# Patient Record
Sex: MALE | Employment: FULL TIME | URBAN - METROPOLITAN AREA
[De-identification: names, ages, dates, MRNs, and addresses within clinical notes are randomized per-mention and may not be internally consistent; named-entity substitution may affect disease eponyms.]

---

## 2017-04-26 ENCOUNTER — OFFICE VISIT (OUTPATIENT)
Dept: SLEEP MEDICINE | Age: 67
End: 2017-04-26

## 2017-04-26 VITALS
HEART RATE: 88 BPM | SYSTOLIC BLOOD PRESSURE: 150 MMHG | BODY MASS INDEX: 38.47 KG/M2 | TEMPERATURE: 97.6 F | DIASTOLIC BLOOD PRESSURE: 84 MMHG | HEIGHT: 72 IN | OXYGEN SATURATION: 94 % | WEIGHT: 284 LBS

## 2017-04-26 DIAGNOSIS — E66.9 OBESITY (BMI 30-39.9): ICD-10-CM

## 2017-04-26 DIAGNOSIS — G47.33 OSA (OBSTRUCTIVE SLEEP APNEA): Primary | ICD-10-CM

## 2017-04-26 NOTE — PATIENT INSTRUCTIONS
217 [facility] [place]. [place], [address]  Tel.  [phone]  Fax. 100 [facility] 60  [place], [address] [facility]  Tel.  [phone]  Fax. [phone] [address]  Tel.  [phone]  Fax. [phone]     Learning About CPAP for Sleep Apnea  What is CPAP? CPAP is a small machine that you use at home every night while you sleep. It increases air pressure in your throat to keep your airway open. When you have sleep apnea, this can help you sleep better so you feel much better. CPAP stands for \"continuous positive airway pressure. \"  The CPAP machine will have one of the following:  · A mask that covers your nose and mouth  · Prongs that fit into your nose  · A mask that covers your nose only, the most common type. This type is called NCPAP. The N stands for \"nasal.\"  Why is it done? CPAP is usually the best treatment for obstructive sleep apnea. It is the first treatment choice and the most widely used. Your doctor may suggest CPAP if you have:  · Moderate to severe sleep apnea. · Sleep apnea and coronary artery disease (CAD) or heart failure. How does it help? · CPAP can help you have more normal sleep, so you feel less sleepy and more alert during the daytime. · CPAP may help keep heart failure or other heart problems from getting worse. · NCPAP may help lower your blood pressure. · If you use CPAP, your bed partner may also sleep better because you are not snoring or restless. What are the side effects? Some people who use CPAP have:  · A dry or stuffy nose and a sore throat. · Irritated skin on the face. · Sore eyes. · Bloating. If you have any of these problems, work with your doctor to fix them. Here are some things you can try:  · Be sure the mask or nasal prongs fit well. · See if your doctor can adjust the pressure of your CPAP. · If your nose is dry, try a humidifier.   · If your nose is runny or stuffy, try decongestant medicine or a steroid nasal spray. If these things do not help, you might try a different type of machine. Some machines have air pressure that adjusts on its own. Others have air pressures that are different when you breathe in than when you breathe out. This may reduce discomfort caused by too much pressure in your nose. Where can you learn more? Go to [employer]  Enter Pilar Haver in the search box to learn more about \"Learning About CPAP for Sleep Apnea. \"   © 3967-7692 Healthwise, Incorporated. Care instructions adapted under license by [employer] (which disclaims liability or warranty for this information). This care instruction is for use with your licensed healthcare professional. If you have questions about a medical condition or this instruction, always ask your healthcare professional. [place] [address] any warranty or liability for your use of this information. Content Version: 1.3.50166; Last Revised: January 11, 2010  PROPER SLEEP HYGIENE    What to avoid  · Do not have drinks with caffeine, such as coffee or black tea, for 8 hours before bed. · Do not smoke or use other types of tobacco near bedtime. Nicotine is a stimulant and can keep you awake. · Avoid drinking alcohol late in the evening, because it can cause you to wake in the middle of the night. · Do not eat a big meal close to bedtime. If you are hungry, eat a light snack. · Do not drink a lot of water close to bedtime, because the need to urinate may wake you up during the night. · Do not read or watch TV in bed. Use the bed only for sleeping and sexual activity. What to try  · Go to bed at the same time every night, and wake up at the same time every morning. Do not take naps during the day. · Keep your bedroom quiet, dark, and cool. · Get regular exercise, but not within 3 to 4 hours of your bedtime. .  · Sleep on a comfortable pillow and mattress.   · If watching the clock makes you anxious, turn it facing away from you so you cannot see the time. · If you worry when you lie down, start a worry book. Well before bedtime, write down your worries, and then set the book and your concerns aside. · Try meditation or other relaxation techniques before you go to bed. · If you cannot fall asleep, get up and go to another room until you feel sleepy. Do something relaxing. Repeat your bedtime routine before you go to bed again. · Make your house quiet and calm about an hour before bedtime. Turn down the lights, turn off the TV, log off the computer, and turn down the volume on music. This can help you relax after a busy day. Drowsy Driving: The [employer] cites drowsiness as a causing factor in more than 078,079 police reported crashes annually, resulting in 76,000 injuries and 1,500 deaths. Other surveys suggest 55% of people polled have driven while drowsy in the past year, 23% had fallen asleep but not crashed, 3% crashed, and 2% had and accident due to drowsy driving. Who is at risk? Young Drivers: One study of drowsy driving accidents states that 55% of the drivers were under 25 years. Of those, 75% were male. Shift Workers and Travelers: People who work overnight or travel across time zones frequently are at higher risk of experiencing Circadian Rhythm Disorders. They are trying to work and function when their body is programed to sleep. Sleep Deprived: Lack of sleep has a serious impact on your ability to pay attention or focus on a task. Consistently getting less than the average of 8 hours your body needs creates partial or cumulative sleep deprivation. Untreated Sleep Disorders: Sleep Apnea, Narcolepsy, R.L.S., and other sleep disorders (untreated) prevent a person from getting enough restful sleep. This leads to excessive daytime sleepiness and increases the risk for drowsy driving accidents by up to 7 times.   Medications / Alcohol: Even over the counter medications can cause drowsiness. Medications that impair a drivers attention should have a warning label. Alcohol naturally makes you sleepy and on its own can cause accidents. Combined with excessive drowsiness its effects are amplified. Signs of Drowsy Driving:   * You don't remember driving the last few miles   * You may drift out of your dennise   * You are unable to focus and your thoughts wander   * You may yawn more often than normal   * You have difficulty keeping your eyes open / nodding off   * Missing traffic signs, speeding, or tailgating  Prevention-   Good sleep hygiene, lifestyle and behavioral choices have the most impact on drowsy driving. There is no substitute for sleep and the average person requires 8 hours nightly. If you find yourself driving drowsy, stop and sleep. Consider the sleep hygiene tips provided during your visit as well. Medication Refill Policy: Refills for all medications require 1 week advance notice. Please have your pharmacy fax a refill request. We are unable to fax, or call in \"controled substance\" medications and you will need to pick these prescriptions up from our office. ABS Activation    Thank you for requesting access to ABS. Please follow the instructions below to securely access and download your online medical record. ABS allows you to send messages to your doctor, view your test results, renew your prescriptions, schedule appointments, and more. How Do I Sign Up? 1. In your internet browser, go to [email or web address]. 2. Click on the First Time User? Click Here link in the Sign In box. You will see the New Member Sign Up page. 3. Enter your ABS Access Code exactly as it appears below. You will not need to use this code after youve completed the sign-up process. If you do not sign up before the expiration date, you must request a new code.     ABS Access Code: [email or web address]  Expires: 2017  9:40 AM (This is the date your "[employer] access code will )    4. Enter the last four digits of your Social Security Number (xxxx) and Date of Birth (mm/dd/yyyy) as indicated and click Submit. You will be taken to the next sign-up page. 5. Create a "[employer] ID. This will be your "[employer] login ID and cannot be changed, so think of one that is secure and easy to remember. 6. Create a "[employer] password. You can change your password at any time. 7. Enter your Password Reset Question and Answer. This can be used at a later time if you forget your password. 8. Enter your e-mail address. You will receive e-mail notification when new information is available in [address] 9. Click Sign Up. You can now view and download portions of your medical record. 10. Click the Download Summary menu link to download a portable copy of your medical information. Additional Information    If you have questions, please call [phone]. Remember, "[employer] is NOT to be used for urgent needs. For medical emergencies, dial 911. Starting a Weight Loss Plan: After Your Visit  Your Care Instructions  If you are thinking about losing weight, it can be hard to know where to start. Your doctor can help you set up a weight loss plan that best meets your needs. You may want to take a class on nutrition or exercise, or join a weight loss support group. If you have questions about how to make changes to your eating or exercise habits, ask your doctor about seeing a registered dietitian or an exercise specialist.  It can be a big challenge to lose weight. But you do not have to make huge changes at once. Make small changes, and stick with them. When those changes become habit, add a few more changes. If you do not think you are ready to make changes right now, try to pick a date in the future.  Make an appointment to see your doctor to discuss whether the time is right for you to start a plan.  Follow-up care is a key part of your treatment and safety. Be sure to make and go to all appointments, and call your doctor if you are having problems. It's also a good idea to know your test results and keep a list of the medicines you take. How can you care for yourself at home? · Set realistic goals. Many people expect to lose much more weight than is likely. A weight loss of 5% to 10% of your body weight may be enough to improve your health. · Get family and friends involved to provide support. Talk to them about why you are trying to lose weight, and ask them to help. They can help by participating in exercise and having meals with you, even if they may be eating something different. · Find what works best for you. If you do not have time or do not like to cook, a program that offers meal replacement bars or shakes may be better for you. Or if you like to prepare meals, finding a plan that includes daily menus and recipes may be best.  · Ask your doctor about other health professionals who can help you achieve your weight loss goals. ¨ A dietitian can help you make healthy changes in your diet. ¨ An exercise specialist or  can help you develop a safe and effective exercise program.  ¨ A counselor or psychiatrist can help you cope with issues such as depression, anxiety, or family problems that can make it hard to focus on weight loss. · Consider joining a support group for people who are trying to lose weight. Your doctor can suggest groups in your area. Where can you learn more? Go to [employer].be  Enter U357 in the search box to learn more about \"Starting a Weight Loss Plan: After Your Visit. \"   © 6839-0668 Healthwise, Incorporated. Care instructions adapted under license by [employer] (which disclaims liability or warranty for this information).  This care instruction is for use with your licensed healthcare professional. If you have questions about a medical condition or this instruction, always ask your healthcare professional. [place] [address] any warranty or liability for your use of this information.   Content Version: 1.1.36693; Last Revised: September 1, 2009

## 2017-04-26 NOTE — PROGRESS NOTES
217 [facility] [place]. [place], [address]  Tel.  [phone]  Fax. 100 [facility] 60  [place], [address]  Tel.  [phone]  Fax. [phone] [address] [place] [address]  Tel.  [phone]  Fax. [phone]     S>Parviz Oneil is a 61 y.o. male seen for a positive airway pressure follow-up. He reports no problems using the device. The following problems are identified:    Drowsiness no Problems exhaling no   Snoring no Forget to put on no   Mask Comfortable yes Can't fall asleep no   Dry Mouth no Mask falls off no   Air Leaking no Frequent awakenings no       He admits that his sleep has improved. Therapy Apnea Index averaged over PAP use: 0.3 /hr which reflects improved sleep breathing condition. No Known Allergies    He has a current medication list which includes the following prescription(s): ventolin hfa, amlodipine, advair diskus, glipizide sr, metformin, irbesartan, torsemide, crestor, aspirin, omega-3 fatty acids, and potassium chloride sa. .      He  has a past medical history of CAD (coronary artery disease) and Hypertension. Baldwin Sleepiness Score: 6   and Modified F.O.S.Q. Score Total / 2: 17   which reflect improved sleep quality over therapy time. O>    Visit Vitals    /84    Pulse 88    Temp 97.6 °F (36.4 °C)    Ht 6' (1.829 m)    Wt 284 lb (128.8 kg)    SpO2 94%    BMI 38.52 kg/m2           General:   Alert, oriented, not in distress   Neck:   No JVD    Chest/Lungs:  symetrical lung expansion , no accessory muscle use    Extremities:  no obvious rashes , negative edema    Neuro:  No focal deficits ; No obvious tremor    Psych:  Normal affect ,  Normal countenance ;         A>    ICD-10-CM ICD-9-CM    1. BRODIE (obstructive sleep apnea) G47.33 327.23 AMB SUPPLY ORDER   2. Obesity (BMI 30-39. 9) E66.9 278.00      AHI = 16 (2016). On CPAP :  4-14 cmH2O.     Compliant:      yes    Therapeutic Response:  Positive    P>      * Follow-up Disposition:  Return if symptoms worsen or fail to improve. * He was asked to contact our office for any problems regarding PAP therapy. * Counseling was provided regarding the importance of regular PAP use and on proper sleep hygiene and safe driving. * Re-enforced proper and regular cleaning for the device. I have reviewed/discussed the above normal BMI with the patient. I have recommended the following interventions: dietary management education, guidance, and counseling . Mirlande Galindo Thank you for allowing us to participate in your patient's medical care.     Naomi Doe M.D.  (electronically signed)  Diplomate in Sleep Medicine, [facility]

## 2017-04-26 NOTE — MR AVS SNAPSHOT
Visit Information Date & Time Provider Department Dept. Phone Encounter #  
 4/26/2017  9:20 AM Juan Pablo Singer MD [address] [place] [address] [id number] Follow-up Instructions Return if symptoms worsen or fail to improve. Follow-up and Disposition History Upcoming Health Maintenance Date Due Hepatitis C Screening 1950 DTaP/Tdap/Td series (1 - Tdap) 11/10/1973 FOBT Q 1 YEAR AGE 50-75 11/10/2002 ZOSTER VACCINE AGE 60> 11/10/2012 INFLUENZA AGE 9 TO ADULT 8/1/2016 Allergies as of 4/26/2017  Review Complete On: 4/26/2017 By: Juan Pablo Singer MD  
 No Known Allergies Current Immunizations  Never Reviewed No immunizations on file. Not reviewed this visit You Were Diagnosed With   
  
 Codes Comments BRODIE (obstructive sleep apnea)    -  Primary ICD-10-CM: G47.33 
ICD-9-CM: 327.23 Obesity (BMI 30-39. 9)     ICD-10-CM: E66.9 ICD-9-CM: 278.00 Vitals BP Pulse Temp Height(growth percentile) Weight(growth percentile) SpO2  
 150/84 88 97.6 °F (36.4 °C) 6' (1.829 m) 284 lb (128.8 kg) 94% BMI Smoking Status 38.52 kg/m2 Never Smoker Vitals History BMI and BSA Data Body Mass Index Body Surface Area 38.52 kg/m 2 2.56 m 2 Your Updated Medication List  
  
   
This list is accurate as of: 4/26/17  9:42 AM.  Always use your most recent med list.  
  
  
  
  
 ADVAIR DISKUS 250-50 mcg/dose diskus inhaler Generic drug:  fluticasone-salmeterol 250 Puffs. amLODIPine 10 mg tablet Commonly known as:  Liliya Arnt 10 mg.  
  
 aspirin 325 mg tablet Commonly known as:  ASPIRIN Take 325 mg by mouth daily. Half of tab daily CRESTOR 10 mg tablet Generic drug:  rosuvastatin 10 mg.  
  
 glipiZIDE SR 10 mg CR tablet Commonly known as:  GLUCOTROL XL  
10 mg.  
  
 irbesartan 300 mg tablet Commonly known as:  AVAPRO  
300 mg.  
  
 metFORMIN 1,000 mg tablet Commonly known as:  GLUCOPHAGE  
300 mg.  
  
 omega-3 fatty acids Cap Take  by mouth.  
  
 potassium chloride SA 10 mEq capsule Commonly known as:  Pedrito Rupinder Take 10 mEq by mouth two (2) times a day. torsemide 20 mg tablet Commonly known as:  DEMADEX  
20 mg. VENTOLIN HFA 90 mcg/actuation inhaler Generic drug:  albuterol 90 mcg. We Performed the Following AMB SUPPLY ORDER [[id number] Custom] Comments:  
 PAP Mask -patient preference, tubing, filters as needed (all sleep supplies) Length of Need = 99 months Trice Hester M.D.  (electronically signed) Diplomate in Sleep Medicine, ABI Follow-up Instructions Return if symptoms worsen or fail to improve. Patient Instructions 217 [facility] [place]. 1668 [facility] Tel.  [phone] Fax. 100 [facility] [address] [place], 200 [facility] Tel.  [phone] Fax. [phone] [facility] [place] [address] Tel.  [phone] Fax. [phone] Learning About CPAP for Sleep Apnea What is CPAP? CPAP is a small machine that you use at home every night while you sleep. It increases air pressure in your throat to keep your airway open. When you have sleep apnea, this can help you sleep better so you feel much better. CPAP stands for \"continuous positive airway pressure. \" The CPAP machine will have one of the following: · A mask that covers your nose and mouth · Prongs that fit into your nose · A mask that covers your nose only, the most common type. This type is called NCPAP. The N stands for \"nasal.\" Why is it done? CPAP is usually the best treatment for obstructive sleep apnea. It is the first treatment choice and the most widely used. Your doctor may suggest CPAP if you have: · Moderate to severe sleep apnea. · Sleep apnea and coronary artery disease (CAD) or heart failure. How does it help? · CPAP can help you have more normal sleep, so you feel less sleepy and more alert during the daytime. · CPAP may help keep heart failure or other heart problems from getting worse. · NCPAP may help lower your blood pressure. · If you use CPAP, your bed partner may also sleep better because you are not snoring or restless. What are the side effects? Some people who use CPAP have: · A dry or stuffy nose and a sore throat. · Irritated skin on the face. · Sore eyes. · Bloating. If you have any of these problems, work with your doctor to fix them. Here are some things you can try: · Be sure the mask or nasal prongs fit well. · See if your doctor can adjust the pressure of your CPAP. · If your nose is dry, try a humidifier. · If your nose is runny or stuffy, try decongestant medicine or a steroid nasal spray. If these things do not help, you might try a different type of machine. Some machines have air pressure that adjusts on its own. Others have air pressures that are different when you breathe in than when you breathe out. This may reduce discomfort caused by too much pressure in your nose. Where can you learn more? Go to SciFluor Life Sciences.be Enter N064 in the search box to learn more about \"Learning About CPAP for Sleep Apnea. \"  
© 0303-3145 Healthwise, Incorporated. Care instructions adapted under license by [facility] (which disclaims liability or warranty for this information). This care instruction is for use with your licensed healthcare professional. If you have questions about a medical condition or this instruction, always ask your healthcare professional. [address] any warranty or liability for your use of this information. Content Version: 0.7.36088; Last Revised: January 11, 2010 PROPER SLEEP HYGIENE What to avoid · Do not have drinks with caffeine, such as coffee or black tea, for 8 hours before bed. · Do not smoke or use other types of tobacco near bedtime. Nicotine is a stimulant and can keep you awake. · Avoid drinking alcohol late in the evening, because it can cause you to wake in the middle of the night. · Do not eat a big meal close to bedtime. If you are hungry, eat a light snack. · Do not drink a lot of water close to bedtime, because the need to urinate may wake you up during the night. · Do not read or watch TV in bed. Use the bed only for sleeping and sexual activity. What to try · Go to bed at the same time every night, and wake up at the same time every morning. Do not take naps during the day. · Keep your bedroom quiet, dark, and cool. · Get regular exercise, but not within 3 to 4 hours of your bedtime. Toney Juarez · Sleep on a comfortable pillow and mattress. · If watching the clock makes you anxious, turn it facing away from you so you cannot see the time. · If you worry when you lie down, start a worry book. Well before bedtime, write down your worries, and then set the book and your concerns aside. · Try meditation or other relaxation techniques before you go to bed. · If you cannot fall asleep, get up and go to another room until you feel sleepy. Do something relaxing. Repeat your bedtime routine before you go to bed again. · Make your house quiet and calm about an hour before bedtime. Turn down the lights, turn off the TV, log off the computer, and turn down the volume on music. This can help you relax after a busy day. Drowsy Driving: The [facility] 54 cites drowsiness as a causing factor in more than 393,904 police reported crashes annually, resulting in 76,000 injuries and 1,500 deaths. Other surveys suggest 55% of people polled have driven while drowsy in the past year, 23% had fallen asleep but not crashed, 3% crashed, and 2% had and accident due to drowsy driving. Who is at risk? Young Drivers: One study of drowsy driving accidents states that 55% of the drivers were under 25 years. Of those, 75% were male. Shift Workers and Travelers: People who work overnight or travel across time zones frequently are at higher risk of experiencing Circadian Rhythm Disorders. They are trying to work and function when their body is programed to sleep. Sleep Deprived: Lack of sleep has a serious impact on your ability to pay attention or focus on a task. Consistently getting less than the average of 8 hours your body needs creates partial or cumulative sleep deprivation. Untreated Sleep Disorders: Sleep Apnea, Narcolepsy, R.L.S., and other sleep disorders (untreated) prevent a person from getting enough restful sleep. This leads to excessive daytime sleepiness and increases the risk for drowsy driving accidents by up to 7 times. Medications / Alcohol: Even over the counter medications can cause drowsiness. Medications that impair a drivers attention should have a warning label. Alcohol naturally makes you sleepy and on its own can cause accidents. Combined with excessive drowsiness its effects are amplified. Signs of Drowsy Driving: * You don't remember driving the last few miles * You may drift out of your dennise * You are unable to focus and your thoughts wander * You may yawn more often than normal 
 * You have difficulty keeping your eyes open / nodding off * Missing traffic signs, speeding, or tailgating Prevention-  
Good sleep hygiene, lifestyle and behavioral choices have the most impact on drowsy driving. There is no substitute for sleep and the average person requires 8 hours nightly. If you find yourself driving drowsy, stop and sleep. Consider the sleep hygiene tips provided during your visit as well. Medication Refill Policy: Refills for all medications require 1 week advance notice.  Please have your pharmacy fax a refill request. We are unable to fax, or call in \"controled substance\" medications and you will need to pick these prescriptions up from our office. MyChart Activation Thank you for requesting access to [employer]. Please follow the instructions below to securely access and download your online medical record. [employer] allows you to send messages to your doctor, view your test results, renew your prescriptions, schedule appointments, and more. How Do I Sign Up? 1. In your internet browser, go to [email or web address] 2. Click on the First Time User? Click Here link in the Sign In box. You will see the New Member Sign Up page. 3. Enter your [employer] Access Code exactly as it appears below. You will not need to use this code after youve completed the sign-up process. If you do not sign up before the expiration date, you must request a new code. [employer] Access Code: [email or web address] Expires: 2017  9:40 AM (This is the date your [employer] access code will ) 4. Enter the last four digits of your Social Security Number (xxxx) and Date of Birth (mm/dd/yyyy) as indicated and click Submit. You will be taken to the next sign-up page. 5. Create a [employer] ID. This will be your [employer] login ID and cannot be changed, so think of one that is secure and easy to remember. 6. Create a [employer] password. You can change your password at any time. 7. Enter your Password Reset Question and Answer. This can be used at a later time if you forget your password. 8. Enter your e-mail address. You will receive e-mail notification when new information is available in [address] 9. Click Sign Up. You can now view and download portions of your medical record. 10. Click the Download Summary menu link to download a portable copy of your medical information. Additional Information If you have questions, please call [phone].  Remember, [address] is NOT to be used for urgent needs. For medical emergencies, dial 911. Starting a Weight Loss Plan: After Your Visit Your Care Instructions If you are thinking about losing weight, it can be hard to know where to start. Your doctor can help you set up a weight loss plan that best meets your needs. You may want to take a class on nutrition or exercise, or join a weight loss support group. If you have questions about how to make changes to your eating or exercise habits, ask your doctor about seeing a registered dietitian or an exercise specialist. 
It can be a big challenge to lose weight. But you do not have to make huge changes at once. Make small changes, and stick with them. When those changes become habit, add a few more changes. If you do not think you are ready to make changes right now, try to pick a date in the future. Make an appointment to see your doctor to discuss whether the time is right for you to start a plan. Follow-up care is a key part of your treatment and safety. Be sure to make and go to all appointments, and call your doctor if you are having problems. It's also a good idea to know your test results and keep a list of the medicines you take. How can you care for yourself at home? · Set realistic goals. Many people expect to lose much more weight than is likely. A weight loss of 5% to 10% of your body weight may be enough to improve your health. · Get family and friends involved to provide support. Talk to them about why you are trying to lose weight, and ask them to help. They can help by participating in exercise and having meals with you, even if they may be eating something different. · Find what works best for you. If you do not have time or do not like to cook, a program that offers meal replacement bars or shakes may be better for you.  Or if you like to prepare meals, finding a plan that includes daily menus and recipes may be best. 
 · Ask your doctor about other health professionals who can help you achieve your weight loss goals. ¨ A dietitian can help you make healthy changes in your diet. ¨ An exercise specialist or  can help you develop a safe and effective exercise program. 
¨ A counselor or psychiatrist can help you cope with issues such as depression, anxiety, or family problems that can make it hard to focus on weight loss. · Consider joining a support group for people who are trying to lose weight. Your doctor can suggest groups in your area. Where can you learn more? Go to ProteoTech.be Enter T664 in the search box to learn more about \"Starting a Weight Loss Plan: After Your Visit. \"  
© 7050-5340 Healthwise, Incorporated. Care instructions adapted under license by Kandice Chavis (which disclaims liability or warranty for this information). This care instruction is for use with your licensed healthcare professional. If you have questions about a medical condition or this instruction, always ask your healthcare professional. [place] [address] any warranty or liability for your use of this information. Content Version: 2.6.08799; Last Revised: September 1, 2009 Introducing [facility]! Kandice Chavis introduces [employer] patient portal. Now you can access parts of your medical record, email your doctor's office, and request medication refills online. 1. In your internet browser, go to [email or web address] 2. Click on the First Time User? Click Here link in the Sign In box. You will see the New Member Sign Up page. 3. Enter your [employer] Access Code exactly as it appears below. You will not need to use this code after youve completed the sign-up process. If you do not sign up before the expiration date, you must request a new code. · [employer] Access Code: [email or web address] Expires: 7/25/2017  9:40 AM 
 
 4. Enter the last four digits of your Social Security Number (xxxx) and Date of Birth (mm/dd/yyyy) as indicated and click Submit. You will be taken to the next sign-up page. 5. Create a Feed.fm ID. This will be your Feed[employer]fm login ID and cannot be changed, so think of one that is secure and easy to remember. 6. Create a Feed[employer]fm password. You can change your password at any time. 7. Enter your Password Reset Question and Answer. This can be used at a later time if you forget your password. 8. Enter your e-mail address. You will receive e-mail notification when new information is available in [address] 9. Click Sign Up. You can now view and download portions of your medical record. 10. Click the Download Summary menu link to download a portable copy of your medical information. If you have questions, please visit the Frequently Asked Questions section of the Feed.fm website. Remember, [employer]fm is NOT to be used for urgent needs. For medical emergencies, dial 911. Now available from your iPhone and Android! Please provide this summary of care documentation to your next provider. If you have any questions after today's visit, please call [phone].

## 2018-04-11 ENCOUNTER — OFFICE VISIT (OUTPATIENT)
Dept: SLEEP MEDICINE | Age: 68
End: 2018-04-11

## 2018-04-11 ENCOUNTER — DOCUMENTATION ONLY (OUTPATIENT)
Dept: SLEEP MEDICINE | Age: 68
End: 2018-04-11

## 2018-04-11 VITALS
HEART RATE: 99 BPM | DIASTOLIC BLOOD PRESSURE: 94 MMHG | SYSTOLIC BLOOD PRESSURE: 159 MMHG | OXYGEN SATURATION: 90 % | WEIGHT: 286 LBS | BODY MASS INDEX: 38.74 KG/M2 | HEIGHT: 72 IN

## 2018-04-11 DIAGNOSIS — G47.33 OSA (OBSTRUCTIVE SLEEP APNEA): Primary | ICD-10-CM

## 2018-04-11 DIAGNOSIS — E11.9 CONTROLLED TYPE 2 DIABETES MELLITUS WITHOUT COMPLICATION, WITHOUT LONG-TERM CURRENT USE OF INSULIN (HCC): ICD-10-CM

## 2018-04-11 DIAGNOSIS — E66.9 OBESITY (BMI 30-39.9): ICD-10-CM

## 2018-04-11 PROBLEM — E66.01 SEVERE OBESITY (BMI 35.0-39.9) WITH COMORBIDITY (HCC): Status: ACTIVE | Noted: 2018-04-11

## 2018-04-11 NOTE — PATIENT INSTRUCTIONS
[address] [place], [address]  Tel.  [phone]  Fax. 100 [facility] 60  [place], [address]  Tel.  [phone]  Fax. [phone] [address] [place] [address]  Tel.  [phone]  Fax. [phone]     PROPER SLEEP HYGIENE    What to avoid  · Do not have drinks with caffeine, such as coffee or black tea, for 8 hours before bed. · Do not smoke or use other types of tobacco near bedtime. Nicotine is a stimulant and can keep you awake. · Avoid drinking alcohol late in the evening, because it can cause you to wake in the middle of the night. · Do not eat a big meal close to bedtime. If you are hungry, eat a light snack. · Do not drink a lot of water close to bedtime, because the need to urinate may wake you up during the night. · Do not read or watch TV in bed. Use the bed only for sleeping and sexual activity. What to try  · Go to bed at the same time every night, and wake up at the same time every morning. Do not take naps during the day. · Keep your bedroom quiet, dark, and cool. · Get regular exercise, but not within 3 to 4 hours of your bedtime. .  · Sleep on a comfortable pillow and mattress. · If watching the clock makes you anxious, turn it facing away from you so you cannot see the time. · If you worry when you lie down, start a worry book. Well before bedtime, write down your worries, and then set the book and your concerns aside. · Try meditation or other relaxation techniques before you go to bed. · If you cannot fall asleep, get up and go to another room until you feel sleepy. Do something relaxing. Repeat your bedtime routine before you go to bed again. · Make your house quiet and calm about an hour before bedtime. Turn down the lights, turn off the TV, log off the computer, and turn down the volume on music. This can help you relax after a busy day.     Drowsy Driving  The [address] [place] [address] Road Traffic Safety Administration cites drowsiness as a causing factor in more than 968,419 police reported crashes annually, resulting in 76,000 injuries and 1,500 deaths. Other surveys suggest 55% of people polled have driven while drowsy in the past year, 23% had fallen asleep but not crashed, 3% crashed, and 2% had and accident due to drowsy driving. Who is at risk? Young Drivers: One study of drowsy driving accidents states that 55% of the drivers were under 25 years. Of those, 75% were male. Shift Workers and Travelers: People who work overnight or travel across time zones frequently are at higher risk of experiencing Circadian Rhythm Disorders. They are trying to work and function when their body is programed to sleep. Sleep Deprived: Lack of sleep has a serious impact on your ability to pay attention or focus on a task. Consistently getting less than the average of 8 hours your body needs creates partial or cumulative sleep deprivation. Untreated Sleep Disorders: Sleep Apnea, Narcolepsy, R.L.S., and other sleep disorders (untreated) prevent a person from getting enough restful sleep. This leads to excessive daytime sleepiness and increases the risk for drowsy driving accidents by up to 7 times. Medications / Alcohol: Even over the counter medications can cause drowsiness. Medications that impair a drivers attention should have a warning label. Alcohol naturally makes you sleepy and on its own can cause accidents. Combined with excessive drowsiness its effects are amplified. Signs of Drowsy Driving:   * You don't remember driving the last few miles   * You may drift out of your dennise   * You are unable to focus and your thoughts wander   * You may yawn more often than normal   * You have difficulty keeping your eyes open / nodding off   * Missing traffic signs, speeding, or tailgating  Prevention-   Good sleep hygiene, lifestyle and behavioral choices have the most impact on drowsy driving.  There is no substitute for sleep and the average person requires 8 hours nightly. If you find yourself driving drowsy, stop and sleep. Consider the sleep hygiene tips provided during your visit as well. Medication Refill Policy: Refills for all medications require 1 week advance notice. Please have your pharmacy fax a refill request. We are unable to fax, or call in \"controled substance\" medications and you will need to pick these prescriptions up from our office. [employer] Activation    Thank you for requesting access to [employer]. Please follow the instructions below to securely access and download your online medical record. [employer] allows you to send messages to your doctor, view your test results, renew your prescriptions, schedule appointments, and more. How Do I Sign Up? 1. In your internet browser, go to [email or web address]. 2. Click on the First Time User? Click Here link in the Sign In box. You will see the New Member Sign Up page. 3. Enter your [employer] Access Code exactly as it appears below. You will not need to use this code after youve completed the sign-up process. If you do not sign up before the expiration date, you must request a new code. [employer] Access Code: [email or web address]  Expires: 7/10/2018  3:58 PM (This is the date your [employer] access code will )    4. Enter the last four digits of your Social Security Number (xxxx) and Date of Birth (mm/dd/yyyy) as indicated and click Submit. You will be taken to the next sign-up page. 5. Create a [employer] ID. This will be your [employer] login ID and cannot be changed, so think of one that is secure and easy to remember. 6. Create a [employer] password. You can change your password at any time. 7. Enter your Password Reset Question and Answer. This can be used at a later time if you forget your password. 8. Enter your e-mail address. You will receive e-mail notification when new information is available in [address] 9. Click Sign Up.  You can now view and download portions of your medical record. 10. Click the Download Summary menu link to download a portable copy of your medical information. Additional Information    If you have questions, please call [phone]. Remember, [email or web address] is NOT to be used for urgent needs. For medical emergencies, dial 911.

## 2018-04-11 NOTE — PROGRESS NOTES
1. Have you been to the ER, urgent care clinic since your last visit? Hospitalized since your last visit? Yes Where: [facility]    2. Have you seen or consulted any other health care providers outside of the [address] [place] [address] since your last visit? Include any pap smears or colon screening.  Yes Reason for visit: Colon Polyp Removal

## 2018-04-11 NOTE — PROGRESS NOTES
[address] [place]. [place], [address]  Tel.  [phone]  Fax. 100 [facility] 60  [place], [address] [facility]  Tel.  [phone]  Fax. [phone] [address]  Tel.  [phone]  Fax. [phone]     S>Parviz Andersen is a 74 y.o. male seen for a positive airway pressure follow-up. He reports no problems using the device. The following problems are identified:    Drowsiness no Problems exhaling no   Snoring no Forget to put on no   Mask Comfortable no Can't fall asleep no   Dry Mouth no Mask falls off no   Air Leaking no Frequent awakenings no     Download reviewed. He admits that his sleep has improved. Therapy Apnea Index averaged over PAP use: 3 /hr which reflects improved sleep breathing condition. No Known Allergies    He has a current medication list which includes the following prescription(s): ventolin hfa, amlodipine, advair diskus, glipizide sr, metformin, irbesartan, torsemide, crestor, aspirin, omega-3 fatty acids, and potassium chloride sa. .      He  has a past medical history of CAD (coronary artery disease) and Hypertension. [place] Sleepiness Score: 8   and Modified F.O.S.Q. Score Total / 2: 18   which reflect improved sleep quality over therapy time. O>    Visit Vitals    BP (!) 159/94 (BP 1 Location: Left arm, BP Patient Position: Sitting)    Pulse 99    Ht 6' (1.829 m)    Wt 286 lb (129.7 kg)    SpO2 90%    BMI 38.79 kg/m2           General:   Alert, oriented, not in distress   Neck:   No JVD    Chest/Lungs:  symetrical lung expansion , no accessory muscle use    Extremities:  no obvious rashes , negative edema    Neuro:  No focal deficits ; No obvious tremor    Psych:  Normal affect ,  Normal countenance ;         A>    ICD-10-CM ICD-9-CM    1. BRODIE (obstructive sleep apnea) G47.33 327.23 AMB SUPPLY ORDER   2. Obesity (BMI 30-39. 9) E66.9 278.00    3.  Controlled type 2 diabetes mellitus without complication, without long-term current use of insulin (HCC) E11.9 250.00      AHI = 16(2016). On CPAP :  4-14 cmH2O. Compliant:      yes    Therapeutic Response:  Positive    P>      * Follow-up Disposition:  Return in about 1 year (around 4/11/2019). he will continue on his current pressure settings. Mask-fitting evaluation and replacement supplies ordered with dme  * He was asked to contact our office for any problems regarding PAP therapy. * Counseling was provided regarding the importance of regular PAP use and on proper sleep hygiene and safe driving. * Re-enforced proper and regular cleaning for the device. 2.Obesity - I have counseled the patient regarding the benefits of weight loss. 3. Type II diabetes - he continues on his current regimen. I have reviewed the relationship between sleep disordered breathing as it relates to diabetes.     Matias Schneider MD  Diplomate in Sleep Medicine  [facility]

## 2019-09-11 ENCOUNTER — OFFICE VISIT (OUTPATIENT)
Dept: SLEEP MEDICINE | Age: 69
End: 2019-09-11

## 2019-09-11 VITALS
BODY MASS INDEX: 37.37 KG/M2 | WEIGHT: 282 LBS | DIASTOLIC BLOOD PRESSURE: 72 MMHG | HEART RATE: 87 BPM | HEIGHT: 73 IN | SYSTOLIC BLOOD PRESSURE: 141 MMHG | OXYGEN SATURATION: 98 %

## 2019-09-11 DIAGNOSIS — I10 ESSENTIAL HYPERTENSION: ICD-10-CM

## 2019-09-11 DIAGNOSIS — E11.9 CONTROLLED TYPE 2 DIABETES MELLITUS WITHOUT COMPLICATION, WITHOUT LONG-TERM CURRENT USE OF INSULIN (HCC): ICD-10-CM

## 2019-09-11 DIAGNOSIS — G47.33 OSA (OBSTRUCTIVE SLEEP APNEA): Primary | ICD-10-CM

## 2019-09-11 RX ORDER — EMPAGLIFLOZIN 10 MG/1
25 TABLET, FILM COATED ORAL
COMMUNITY
Start: 2019-08-05

## 2019-09-11 RX ORDER — FENOFIBRATE 150 MG/1
145 CAPSULE ORAL
COMMUNITY

## 2019-09-11 NOTE — PATIENT INSTRUCTIONS
[address] [place]. [place], [address]  Tel.  [phone]  Fax. 100 [facility] 60  [place], [address] [facility]  Tel.  [phone]  Fax. [phone] [address]  Tel.  [phone]  Fax. [phone]     Learning About CPAP for Sleep Apnea  What is CPAP? CPAP is a small machine that you use at home every night while you sleep. It increases air pressure in your throat to keep your airway open. When you have sleep apnea, this can help you sleep better so you feel much better. CPAP stands for \"continuous positive airway pressure. \"  The CPAP machine will have one of the following:  · A mask that covers your nose and mouth  · Prongs that fit into your nose  · A mask that covers your nose only, the most common type. This type is called NCPAP. The N stands for \"nasal.\"  Why is it done? CPAP is usually the best treatment for obstructive sleep apnea. It is the first treatment choice and the most widely used. Your doctor may suggest CPAP if you have:  · Moderate to severe sleep apnea. · Sleep apnea and coronary artery disease (CAD) or heart failure. How does it help? · CPAP can help you have more normal sleep, so you feel less sleepy and more alert during the daytime. · CPAP may help keep heart failure or other heart problems from getting worse. · NCPAP may help lower your blood pressure. · If you use CPAP, your bed partner may also sleep better because you are not snoring or restless. What are the side effects? Some people who use CPAP have:  · A dry or stuffy nose and a sore throat. · Irritated skin on the face. · Sore eyes. · Bloating. If you have any of these problems, work with your doctor to fix them. Here are some things you can try:  · Be sure the mask or nasal prongs fit well. · See if your doctor can adjust the pressure of your CPAP. · If your nose is dry, try a humidifier.   · If your nose is runny or stuffy, try decongestant medicine or a steroid nasal spray. If these things do not help, you might try a different type of machine. Some machines have air pressure that adjusts on its own. Others have air pressures that are different when you breathe in than when you breathe out. This may reduce discomfort caused by too much pressure in your nose. Where can you learn more? Go to Invoice2go.be  Enter Marium Zhang in the search box to learn more about \"Learning About CPAP for Sleep Apnea. \"   © 5597-0022 Healthwise, Incorporated. Care instructions adapted under license by [employer] (which disclaims liability or warranty for this information). This care instruction is for use with your licensed healthcare professional. If you have questions about a medical condition or this instruction, always ask your healthcare professional. [address] any warranty or liability for your use of this information. Content Version: 3.7.52359; Last Revised: January 11, 2010  PROPER SLEEP HYGIENE    What to avoid  · Do not have drinks with caffeine, such as coffee or black tea, for 8 hours before bed. · Do not smoke or use other types of tobacco near bedtime. Nicotine is a stimulant and can keep you awake. · Avoid drinking alcohol late in the evening, because it can cause you to wake in the middle of the night. · Do not eat a big meal close to bedtime. If you are hungry, eat a light snack. · Do not drink a lot of water close to bedtime, because the need to urinate may wake you up during the night. · Do not read or watch TV in bed. Use the bed only for sleeping and sexual activity. What to try  · Go to bed at the same time every night, and wake up at the same time every morning. Do not take naps during the day. · Keep your bedroom quiet, dark, and cool. · Get regular exercise, but not within 3 to 4 hours of your bedtime. .  · Sleep on a comfortable pillow and mattress.   · If watching the clock makes you anxious, turn it facing away from you so you cannot see the time. · If you worry when you lie down, start a worry book. Well before bedtime, write down your worries, and then set the book and your concerns aside. · Try meditation or other relaxation techniques before you go to bed. · If you cannot fall asleep, get up and go to another room until you feel sleepy. Do something relaxing. Repeat your bedtime routine before you go to bed again. · Make your house quiet and calm about an hour before bedtime. Turn down the lights, turn off the TV, log off the computer, and turn down the volume on music. This can help you relax after a busy day. Drowsy Driving: The [facility] cites drowsiness as a causing factor in more than 627,817 police reported crashes annually, resulting in 76,000 injuries and 1,500 deaths. Other surveys suggest 55% of people polled have driven while drowsy in the past year, 23% had fallen asleep but not crashed, 3% crashed, and 2% had and accident due to drowsy driving. Who is at risk? Young Drivers: One study of drowsy driving accidents states that 55% of the drivers were under 25 years. Of those, 75% were male. Shift Workers and Travelers: People who work overnight or travel across time zones frequently are at higher risk of experiencing Circadian Rhythm Disorders. They are trying to work and function when their body is programed to sleep. Sleep Deprived: Lack of sleep has a serious impact on your ability to pay attention or focus on a task. Consistently getting less than the average of 8 hours your body needs creates partial or cumulative sleep deprivation. Untreated Sleep Disorders: Sleep Apnea, Narcolepsy, R.L.S., and other sleep disorders (untreated) prevent a person from getting enough restful sleep. This leads to excessive daytime sleepiness and increases the risk for drowsy driving accidents by up to 7 times.   Medications / Alcohol: Even over the counter medications can cause drowsiness. Medications that impair a drivers attention should have a warning label. Alcohol naturally makes you sleepy and on its own can cause accidents. Combined with excessive drowsiness its effects are amplified. Signs of Drowsy Driving:   * You don't remember driving the last few miles   * You may drift out of your dennise   * You are unable to focus and your thoughts wander   * You may yawn more often than normal   * You have difficulty keeping your eyes open / nodding off   * Missing traffic signs, speeding, or tailgating  Prevention-   Good sleep hygiene, lifestyle and behavioral choices have the most impact on drowsy driving. There is no substitute for sleep and the average person requires 8 hours nightly. If you find yourself driving drowsy, stop and sleep. Consider the sleep hygiene tips provided during your visit as well. Medication Refill Policy: Refills for all medications require 1 week advance notice. Please have your pharmacy fax a refill request. We are unable to fax, or call in \"controled substance\" medications and you will need to pick these prescriptions up from our office. Altar Activation    Thank you for requesting access to Altar. Please follow the instructions below to securely access and download your online medical record. Altar allows you to send messages to your doctor, view your test results, renew your prescriptions, schedule appointments, and more. How Do I Sign Up? 1. In your internet browser, go to [email or web address]. 2. Click on the First Time User? Click Here link in the Sign In box. You will see the New Member Sign Up page. 3. Enter your Altar Access Code exactly as it appears below. You will not need to use this code after youve completed the sign-up process. If you do not sign up before the expiration date, you must request a new code.     Altar Access Code: [email or web address]  Expires: 10/26/2019  2:27 PM (This is the date your jellyfish access code will )    4. Enter the last four digits of your Social Security Number (xxxx) and Date of Birth (mm/dd/yyyy) as indicated and click Submit. You will be taken to the next sign-up page. 5. Create a jellyfish ID. This will be your jellyfish login ID and cannot be changed, so think of one that is secure and easy to remember. 6. Create a jellyfish password. You can change your password at any time. 7. Enter your Password Reset Question and Answer. This can be used at a later time if you forget your password. 8. Enter your e-mail address. You will receive e-mail notification when new information is available in [address] 9. Click Sign Up. You can now view and download portions of your medical record. 10. Click the Download Summary menu link to download a portable copy of your medical information. Additional Information    If you have questions, please call [phone]. Remember, jellyfish is NOT to be used for urgent needs. For medical emergencies, dial 911.

## 2019-09-11 NOTE — PROGRESS NOTES
[address] [place]. [place], [address]   Tel.  [phone]   Fax. 100 [facility] 60   [place], [address] [facility]   Tel.  [phone]   Fax. [phone] [address]   Tel.  [phone]   Fax. [phone]       Subjective: Harvinder Beckett is a 79 y.o. male seen for a positive airway pressure follow-up, last seen by Dr. Mellissa Milner on 4/11/2018, prior notes reviewed in detail. Home sleep test 1/2016 showed AHI of 19.8/hr with a lowest SaO2 of 71%. He is [place] for annual follow up. He reports no problems using the device. The following concerns reviewed:    Drowsiness no Problems exhaling no   Snoring no Forget to put on no   Mask Comfortable yes Can't fall asleep no   Dry Mouth no Mask falls off no   Air Leaking no Frequent awakenings no       He admits that his sleep has improved on PAP therapy using full face mask and heated tubing. Review of device download indicated:  Auto pressure: 4-14 cmH2O; 95th Percentile Leak: 0 L/Minute; % Used Days >= 4 hours: 77.    Therapy Apnea Index averaged over PAP use: 3.1 /hr which reflects improved sleep breathing condition. [place] Sleepiness Score: 7 and Modified F.O.S.Q. Score Total / 2: 18.5 which reflects improved sleep quality over therapy time. No Known Allergies    He has a current medication list which includes the following prescription(s): jardiance, exenatide microspheres, fenofibrate, ventolin hfa, amlodipine, advair diskus, glipizide sr, metformin, irbesartan, torsemide, crestor, aspirin, omega-3 fatty acids, and potassium chloride sa. .      He  has a past medical history of CAD (coronary artery disease) and Hypertension. Sleep Review of Systems: notable for Negative difficulty falling asleep; Negative awakenings at night; Negative early morning headaches; Negative memory problems; Negative concentration issues;  Negative chest pain; Negative shortness of breath; Negative significant joint pain at night; Negative significant muscle pain at night; Negative rashes or itching; Negative heartburn; Negative significant mood issues; 0 afternoon naps per week; Negative history of any automobile or occupational accidents due to daytime drowsiness      Objective:     Visit Vitals  /72   Pulse 87   Ht 6' 1\" (1.854 m)   Wt 282 lb (127.9 kg)   SpO2 98%   BMI 37.21 kg/m²          General:   Alert, oriented, not in acute distress   Eyes:  Anicteric Sclerae; no obvious strabismus   Nose:  No obvious nasal septum deviation    Oropharynx:   Mallampati score 3, thick tongue base, uvula not seen,low-lying soft palate, narrow tonsilo-pharyngeal pilars   Neck:   Midline trachea   Chest/Lungs:  Symmetrical lung expansion, clear lung fields on auscultation    CVS:  Normal rate, regular rhythm,  no JVD   Extremities:  No obvious rashes, no edema    Neuro:  No focal deficits; No obvious tremor    Psych:  Normal affect,  normal countenance       Assessment:       ICD-10-CM ICD-9-CM    1. BRODIE (obstructive sleep apnea) G47.33 327.23 AMB SUPPLY ORDER   2. Essential hypertension I10 401.9    3. Controlled type 2 diabetes mellitus without complication, without long-term current use of insulin (HCC) E11.9 250.00    4. Adult BMI 37.0-37.9 kg/sq m Z68.37 V85.37        AHI = 19.8(1/2016). On APAP :  4-14 cmH2O. He is compliant with PAP therapy and PAP continues to benefit patient and remains necessary for control of his sleep apnea. Plan:     Follow-up and Dispositions    · Return in about 1 year (around 9/11/2020). * Continue on current pressures. Device was set up 2/2016 - he should be eligible for a new device in 2021.     * Supplies ordered - Full face mask and heated tubing    Orders Placed This Encounter    AMB SUPPLY ORDER     Diagnosis: (G47.33) BRODIE (obstructive sleep apnea)  (primary encounter diagnosis)     Replacement Supplies for Positive Airway Pressure Therapy Device: Duration of need: 99 months.  Full Face Mask 1 every 3 months.  Full Face Mask Cushion 1 per month.  Headgear 1 every 6 months.  Tubing with heating element 1 every 3 months.  Filter(s) Disposable 2 per month.  Filter(s) Non-Disposable 1 every 6 months. .   [address] [facility] for Humidifier (Replace) 1 every 6 months. OLEG Newton; NPI: [id number]    Electronically signed. Date:- 09/11/19       * Counseling was provided regarding the importance of regular PAP use with emphasis on ensuring sufficient total sleep time, proper sleep hygiene, and safe driving. * Re-enforced proper and regular cleaning for the device. * He was asked to contact our office for any problems regarding PAP therapy. 2. Hypertension -  continue on his current regimen, he will continue to monitor his BP and follow up with his PMD for reevaluation/adjustment of medications if warranted. I have reviewed the relationship between hypertension as it relates to sleep-disordered breathing. 3. Type II diabetes -  he continues on his current regimen. I have reviewed the relationship between sleep disordered breathing as it relates to diabetes. 4. Recommended a dedicated weight loss program through appropriate diet and exercise regimen as significant weight reduction has been shown to reduce severity of obstructive sleep apnea. Patient's phone number [phone] (home)  and [phone] were reviewed and confirmed for accuracy. He gives permission for messages regarding results and appointments to be left at that number. OLEG Newton, [address] [place] [address]  Electronically signed.  09/11/19

## 2019-09-13 ENCOUNTER — DOCUMENTATION ONLY (OUTPATIENT)
Dept: SLEEP MEDICINE | Age: 69
End: 2019-09-13

## 2020-01-16 NOTE — PROGRESS NOTES
Note reviewed  Agree with plan  Electronically signed by    Roxi Herbert MD  Diplomate in Sleep Medicine  [employer]

## 2020-09-09 ENCOUNTER — DOCUMENTATION ONLY (OUTPATIENT)
Dept: SLEEP MEDICINE | Age: 70
End: 2020-09-09

## 2020-09-09 ENCOUNTER — VIRTUAL VISIT (OUTPATIENT)
Dept: SLEEP MEDICINE | Age: 70
End: 2020-09-09
Payer: COMMERCIAL

## 2020-09-09 DIAGNOSIS — E11.9 CONTROLLED TYPE 2 DIABETES MELLITUS WITHOUT COMPLICATION, WITHOUT LONG-TERM CURRENT USE OF INSULIN (HCC): ICD-10-CM

## 2020-09-09 DIAGNOSIS — G47.33 OSA (OBSTRUCTIVE SLEEP APNEA): Primary | ICD-10-CM

## 2020-09-09 DIAGNOSIS — I10 ESSENTIAL HYPERTENSION: ICD-10-CM

## 2020-09-09 PROCEDURE — 99213 OFFICE O/P EST LOW 20 MIN: CPT | Performed by: NURSE PRACTITIONER

## 2020-09-09 NOTE — PROGRESS NOTES
217 [facility] [place]. [place], [address]   Tel.  [phone]   Fax. 100 [facility] 60   [place], [address] [facility]   Tel.  [phone]   Fax. [phone]  [address]    Tel.  [phone]   Fax. [phone]       Subjective: Dru Fuentes is a 81 y.o. male who was seen by synchronous (real-time) audio-video technology on 9/9/2020. Consent:  He and/or his healthcare decision maker is aware that this patient-initiated Telehealth encounter is a billable service, with coverage as determined by his insurance carrier. He is aware that he may receive a bill and has provided verbal consent to proceed: Yes    I was in the office while conducting this encounter. Patient verified with 's license. Dru Fuentes is seen for a positive airway pressure follow-up, last seen by me on 9/11/2019, previously seen by Dr. Vilma Telles on 4/11/2018, prior notes reviewed in detail. Home sleep test 1/2016 showed AHI of 19.8/hr with a lowest SaO2 of 71%. He  is seen today for follow up on device set up 2/29/2016. He reports no problems using the device. The following concerns reviewed:    Drowsiness no Problems exhaling no   Snoring no Forget to put on no   Mask Comfortable yes Can't fall asleep no   Dry Mouth no Mask falls off no   Air Leaking no Frequent awakenings no       He admits that his sleep has improved on PAP therapy using full face mask and heated tubing. Review of device download indicated:  Auto pressure: 4-14 cmH2O; Peak Avg Pressure: 14.0 cmH2O;  Avg. Device Pressure <= 90 %: 14.0 cmH2O   Average % Night in Large Leak:  0.0  % Used Days >= 4 hours: 100. Avg hours used:  4.5. Therapy Apnea Index averaged over PAP use: 4.3 /hr which reflects improved sleep breathing condition. [place] Sleepiness Score: 2 and Modified F.O.S.Q. Score Total / 2: 19 which reflects improved sleep quality over therapy time.     No Known Allergies    He has a current medication list which includes the following prescription(s): jardiance, exenatide microspheres, fenofibrate, amlodipine, glipizide sr, metformin, irbesartan, torsemide, crestor, aspirin, omega-3 fatty acids, potassium chloride sa, ventolin hfa, and advair diskus. .      He  has a past medical history of CAD (coronary artery disease) and Hypertension. Sleep Review of Systems: notable for Negative difficulty falling asleep; Negative awakenings at night; Negative early morning headaches; Negative memory problems; Negative concentration issues; Negative chest pain; Negative shortness of breath; Negative significant joint pain at night; Negative significant muscle pain at night; Negative rashes or itching; Negative heartburn; Negative significant mood issues; occasional afternoon naps     Objective:   Vitals reported by patient     Patient-Reported Vitals 9/9/2020   Patient-Reported Weight 275 lbs   Patient-Reported Systolic  458   Patient-Reported Diastolic 78      Calculated BMI 37    Physical Exam completed by visual and auditory observation of patient with verbal input from patient. General:   Alert, oriented, not in acute distress   Eyes:  Anicteric Sclerae; no obvious strabismus   Nose:  No obvious nasal septum deviation    Neck:   Midline trachea, no visible mass   Chest/Lungs:  Respiratory effort normal, no visualized signs of difficulty breathing or respiratory distress   CVS:  No JVD   Extremities:  No obvious rashes noted on face, neck, or hands   Neuro:  No facial asymmetry, no focal deficits; no obvious tremor    Psych:  Normal affect,  normal countenance       Assessment:       ICD-10-CM ICD-9-CM    1. BRODIE (obstructive sleep apnea)  G47.33 327.23 AMB SUPPLY ORDER   2. Essential hypertension  I10 401.9    3. Controlled type 2 diabetes mellitus without complication, without long-term current use of insulin (HCC)  E11.9 250.00    4.  Adult BMI 37.0-37.9 kg/sq m  Z68.37 V85.37 AHI = 19.8(1/2016). On APAP :  4-14 cmH2O. He is adherent with PAP therapy and PAP continues to benefit patient and remains necessary for control of his sleep apnea. Plan:     Follow-up and Dispositions    · Return in about 1 year (around 9/9/2021). *   Continue on current pressures    * Supplies ordered - full face mask and heated tubing    Orders Placed This Encounter    AMB SUPPLY ORDER     Diagnosis: (G47.33) BRODIE (obstructive sleep apnea)  (primary encounter diagnosis)     Replacement Supplies for Positive Airway Pressure Therapy Device:   Duration of need: 99 months.  Full Face Mask 1 every 3 months.  Full Face Mask Cushion 1 per month.  Headgear 1 every 6 months.  Tubing with heating element 1 every 3 months.  Filter(s) Disposable 2 per month.  Filter(s) Non-Disposable 1 every 6 months. .   [address] [facility] for Humidifier (Replace) 1 every 6 months. OLEG Escalera; NPI: [id number]    Electronically signed. Date:- 09/09/20       * Counseling was provided regarding the importance of regular PAP use with emphasis on ensuring sufficient total sleep time, proper sleep hygiene, and safe driving. * Re-enforced proper and regular cleaning for the device. * He was asked to contact our office for any problems regarding PAP therapy. 2. Hypertension -  continue on his current regimen, he will continue to monitor his BP and follow up with his PMD for reevaluation/adjustment of medications if warranted. I have reviewed the relationship between hypertension as it relates to sleep-disordered breathing. 3. Type II diabetes -  he continues on his current regimen. I have reviewed the relationship between sleep disordered breathing as it relates to diabetes.     4. Recommended a dedicated weight loss program through appropriate diet and exercise regimen as significant weight reduction has been shown to reduce severity of obstructive sleep apnea. He has lost 5 pounds since prior visit but is not exercising on a regular basis. Patient's phone number [phone] (home)  was reviewed and confirmed for accuracy. He gives permission for messages regarding results and appointments to be left at that number. Due to this being a TeleHealth encounter (During EQIXI-33 public health emergency), evaluation of the following organ systems was limited: Vitals/Constitutional/EENT/Resp/CV/GI//MS/Neuro/Skin/Heme-Lymph-Imm. Pursuant to the emergency declaration under the [address] [place] [address], [address] [place] [address] authority and the [employer] Act, this Virtual Visit was conducted with patient's (and/or legal guardian's) consent, to reduce the risk of exposure to COVID-19 and provide necessary medical care. Services were provided through a video synchronous discussion virtually to substitute for in-person encounter. OLEG Adams, [address] [place] [address]  Electronically signed.  09/09/20

## 2020-09-09 NOTE — PATIENT INSTRUCTIONS
217 [facility] [place]. [address] [facility], [address] Tel.  [phone] Fax. 100 [facility] 60 [place], [address] [facility] [address] Tel.  [phone] Fax. [phone] [address] Tel.  [phone] Fax. [phone] Learning About CPAP for Sleep Apnea What is CPAP? CPAP is a small machine that you use at home every night while you sleep. It increases air pressure in your throat to keep your airway open. When you have sleep apnea, this can help you sleep better so you feel much better. CPAP stands for \"continuous positive airway pressure. \" The CPAP machine will have one of the following: · A mask that covers your nose and mouth · Prongs that fit into your nose · A mask that covers your nose only, the most common type. This type is called NCPAP. The N stands for \"nasal.\" Why is it done? CPAP is usually the best treatment for obstructive sleep apnea. It is the first treatment choice and the most widely used. Your doctor may suggest CPAP if you have: · Moderate to severe sleep apnea. · Sleep apnea and coronary artery disease (CAD) or heart failure. How does it help? · CPAP can help you have more normal sleep, so you feel less sleepy and more alert during the daytime. · CPAP may help keep heart failure or other heart problems from getting worse. · NCPAP may help lower your blood pressure. · If you use CPAP, your bed partner may also sleep better because you are not snoring or restless. What are the side effects? Some people who use CPAP have: · A dry or stuffy nose and a sore throat. · Irritated skin on the face. · Sore eyes. · Bloating. If you have any of these problems, work with your doctor to fix them. Here are some things you can try: · Be sure the mask or nasal prongs fit well. · See if your doctor can adjust the pressure of your CPAP. · If your nose is dry, try a humidifier. · If your nose is runny or stuffy, try decongestant medicine or a steroid nasal spray. If these things do not help, you might try a different type of machine. Some machines have air pressure that adjusts on its own. Others have air pressures that are different when you breathe in than when you breathe out. This may reduce discomfort caused by too much pressure in your nose. Where can you learn more? Go to [employer] Enter S346 in the search box to learn more about \"Learning About CPAP for Sleep Apnea. \"  
© 7891-2422 Healthwise, [employer]orporated. Care instructions adapted under license by Peng Diez (which disclaims liability or warranty for this information). This care instruction is for use with your licensed healthcare professional. If you have questions about a medical condition or this instruction, always ask your healthcare professional. [address] any warranty or liability for your use of this information. Content Version: 6.9.82111; Last Revised: January 11, 2010 PROPER SLEEP HYGIENE What to avoid · Do not have drinks with caffeine, such as coffee or black tea, for 8 hours before bed. · Do not smoke or use other types of tobacco near bedtime. Nicotine is a stimulant and can keep you awake. · Avoid drinking alcohol late in the evening, because it can cause you to wake in the middle of the night. · Do not eat a big meal close to bedtime. If you are hungry, eat a light snack. · Do not drink a lot of water close to bedtime, because the need to urinate may wake you up during the night. · Do not read or watch TV in bed. Use the bed only for sleeping and sexual activity. What to try · Go to bed at the same time every night, and wake up at the same time every morning. Do not take naps during the day. · Keep your bedroom quiet, dark, and cool. · Get regular exercise, but not within 3 to 4 hours of your bedtime. Yuli Zee · Sleep on a comfortable pillow and mattress. · If watching the clock makes you anxious, turn it facing away from you so you cannot see the time. · If you worry when you lie down, start a worry book. Well before bedtime, write down your worries, and then set the book and your concerns aside. · Try meditation or other relaxation techniques before you go to bed. · If you cannot fall asleep, get up and go to another room until you feel sleepy. Do something relaxing. Repeat your bedtime routine before you go to bed again. · Make your house quiet and calm about an hour before bedtime. Turn down the lights, turn off the TV, log off the computer, and turn down the volume on music. This can help you relax after a busy day. Drowsy Driving: The [facility] cites drowsiness as a causing factor in more than 033,298 police reported crashes annually, resulting in 76,000 injuries and 1,500 deaths. Other surveys suggest 55% of people polled have driven while drowsy in the past year, 23% had fallen asleep but not crashed, 3% crashed, and 2% had and accident due to drowsy driving. Who is at risk? Young Drivers: One study of drowsy driving accidents states that 55% of the drivers were under 25 years. Of those, 75% were male. Shift Workers and Travelers: People who work overnight or travel across time zones frequently are at higher risk of experiencing Circadian Rhythm Disorders. They are trying to work and function when their body is programed to sleep. Sleep Deprived: Lack of sleep has a serious impact on your ability to pay attention or focus on a task. Consistently getting less than the average of 8 hours your body needs creates partial or cumulative sleep deprivation.   
Untreated Sleep Disorders: Sleep Apnea, Narcolepsy, R.L.S., and other sleep disorders (untreated) prevent a person from getting enough restful sleep. This leads to excessive daytime sleepiness and increases the risk for drowsy driving accidents by up to 7 times. Medications / Alcohol: Even over the counter medications can cause drowsiness. Medications that impair a drivers attention should have a warning label. Alcohol naturally makes you sleepy and on its own can cause accidents. Combined with excessive drowsiness its effects are amplified. Signs of Drowsy Driving: * You don't remember driving the last few miles * You may drift out of your dennise * You are unable to focus and your thoughts wander * You may yawn more often than normal 
 * You have difficulty keeping your eyes open / nodding off * Missing traffic signs, speeding, or tailgating Prevention-  
Good sleep hygiene, lifestyle and behavioral choices have the most impact on drowsy driving. There is no substitute for sleep and the average person requires 8 hours nightly. If you find yourself driving drowsy, stop and sleep. Consider the sleep hygiene tips provided during your visit as well. Medication Refill Policy: Refills for all medications require 1 week advance notice. Please have your pharmacy fax a refill request. We are unable to fax, or call in \"controled substance\" medications and you will need to pick these prescriptions up from our office. [employer] Thank you for requesting access to [employer]. Please follow the instructions below to securely access and download your online medical record. [employer] allows you to send messages to your doctor, view your test results, renew your prescriptions, schedule appointments, and more. How Do I Sign Up? 1. In your internet browser, go to [email or web address]. 2. Click on the First Time User? Click Here link in the Sign In box. You will see the New Member Sign Up page. 3. Enter your [employer] Access Code exactly as it appears below.  You will not need to use this code after youve completed the sign-up process. If you do not sign up before the expiration date, you must request a new code. [employer] Access Code: [email or web address] Expires: 10/24/2020  1:31 PM (This is the date your Seer access code will ) 4. Enter the last four digits of your Social Security Number (xxxx) and Date of Birth (mm/dd/yyyy) as indicated and click Submit. You will be taken to the next sign-up page. 5. Create a Seer ID. This will be your Seer login ID and cannot be changed, so think of one that is secure and easy to remember. 6. Create a Seer password. You can change your password at any time. 7. Enter your Password Reset Question and Answer. This can be used at a later time if you forget your password. 8. Enter your e-mail address. You will receive e-mail notification when new information is available in [address]. 9. Click Sign Up. You can now view and download portions of your medical record. 10. Click the Download Summary menu link to download a portable copy of your medical information. Additional Information If you have questions, please call [phone]. Remember, [employer] is NOT to be used for urgent needs. For medical emergencies, dial 911.

## 2021-01-26 ENCOUNTER — TRANSCRIBE ORDER (OUTPATIENT)
Dept: SCHEDULING | Age: 71
End: 2021-01-26

## 2021-01-26 DIAGNOSIS — J45.909 ASTHMA: Primary | ICD-10-CM

## 2021-09-09 ENCOUNTER — DOCUMENTATION ONLY (OUTPATIENT)
Dept: SLEEP MEDICINE | Age: 71
End: 2021-09-09

## 2021-09-09 ENCOUNTER — OFFICE VISIT (OUTPATIENT)
Dept: SLEEP MEDICINE | Age: 71
End: 2021-09-09
Payer: COMMERCIAL

## 2021-09-09 VITALS
OXYGEN SATURATION: 87 % | HEIGHT: 73 IN | DIASTOLIC BLOOD PRESSURE: 79 MMHG | SYSTOLIC BLOOD PRESSURE: 140 MMHG | BODY MASS INDEX: 35.92 KG/M2 | TEMPERATURE: 98.2 F | WEIGHT: 271 LBS | HEART RATE: 90 BPM

## 2021-09-09 DIAGNOSIS — I10 ESSENTIAL HYPERTENSION: ICD-10-CM

## 2021-09-09 DIAGNOSIS — G47.33 OSA (OBSTRUCTIVE SLEEP APNEA): Primary | ICD-10-CM

## 2021-09-09 DIAGNOSIS — E11.9 CONTROLLED TYPE 2 DIABETES MELLITUS WITHOUT COMPLICATION, WITHOUT LONG-TERM CURRENT USE OF INSULIN (HCC): ICD-10-CM

## 2021-09-09 PROCEDURE — 99213 OFFICE O/P EST LOW 20 MIN: CPT | Performed by: NURSE PRACTITIONER

## 2021-09-09 NOTE — PATIENT INSTRUCTIONS
[address] [facility] [place]. [place], [address]  Tel.  [phone]  Fax. [address] [facility] [address]  [address] [facility] [address], [address]  Tel.  [phone]  Fax. [phone] [address]  Tel.  [phone]  Fax. [phone]     Learning About CPAP for Sleep Apnea  What is CPAP? CPAP is a small machine that you use at home every night while you sleep. It increases air pressure in your throat to keep your airway open. When you have sleep apnea, this can help you sleep better so you feel much better. CPAP stands for \"continuous positive airway pressure. \"  The CPAP machine will have one of the following:  · A mask that covers your nose and mouth  · Prongs that fit into your nose  · A mask that covers your nose only, the most common type. This type is called NCPAP. The N stands for \"nasal.\"  Why is it done? CPAP is usually the best treatment for obstructive sleep apnea. It is the first treatment choice and the most widely used. Your doctor may suggest CPAP if you have:  · Moderate to severe sleep apnea. · Sleep apnea and coronary artery disease (CAD) or heart failure. How does it help? · CPAP can help you have more normal sleep, so you feel less sleepy and more alert during the daytime. · CPAP may help keep heart failure or other heart problems from getting worse. · NCPAP may help lower your blood pressure. · If you use CPAP, your bed partner may also sleep better because you are not snoring or restless. What are the side effects? Some people who use CPAP have:  · A dry or stuffy nose and a sore throat. · Irritated skin on the face. · Sore eyes. · Bloating. If you have any of these problems, work with your doctor to fix them. Here are some things you can try:  · Be sure the mask or nasal prongs fit well. · See if your doctor can adjust the pressure of your CPAP. · If your nose is dry, try a humidifier.   · If your nose is runny or stuffy, try decongestant medicine or a steroid nasal spray. If these things do not help, you might try a different type of machine. Some machines have air pressure that adjusts on its own. Others have air pressures that are different when you breathe in than when you breathe out. This may reduce discomfort caused by too much pressure in your nose. Where can you learn more? Go to Immunetics.be  Enter Corry Lilly in the search box to learn more about \"Learning About CPAP for Sleep Apnea. \"   © 5547-6011 Healthwise, Incorporated. Care instructions adapted under license by [employer] (which disclaims liability or warranty for this information). This care instruction is for use with your licensed healthcare professional. If you have questions about a medical condition or this instruction, always ask your healthcare professional. [address] any warranty or liability for your use of this information. Content Version: 3.4.11119; Last Revised: January 11, 2010  PROPER SLEEP HYGIENE    What to avoid  · Do not have drinks with caffeine, such as coffee or black tea, for 8 hours before bed. · Do not smoke or use other types of tobacco near bedtime. Nicotine is a stimulant and can keep you awake. · Avoid drinking alcohol late in the evening, because it can cause you to wake in the middle of the night. · Do not eat a big meal close to bedtime. If you are hungry, eat a light snack. · Do not drink a lot of water close to bedtime, because the need to urinate may wake you up during the night. · Do not read or watch TV in bed. Use the bed only for sleeping and sexual activity. What to try  · Go to bed at the same time every night, and wake up at the same time every morning. Do not take naps during the day. · Keep your bedroom quiet, dark, and cool. · Get regular exercise, but not within 3 to 4 hours of your bedtime. .  · Sleep on a comfortable pillow and mattress.   · If watching the clock makes you anxious, turn it facing away from you so you cannot see the time. · If you worry when you lie down, start a worry book. Well before bedtime, write down your worries, and then set the book and your concerns aside. · Try meditation or other relaxation techniques before you go to bed. · If you cannot fall asleep, get up and go to another room until you feel sleepy. Do something relaxing. Repeat your bedtime routine before you go to bed again. · Make your house quiet and calm about an hour before bedtime. Turn down the lights, turn off the TV, log off the computer, and turn down the volume on music. This can help you relax after a busy day. Drowsy Driving: The [employer] cites drowsiness as a causing factor in more than 260,745 police reported crashes annually, resulting in 76,000 injuries and 1,500 deaths. Other surveys suggest 55% of people polled have driven while drowsy in the past year, 23% had fallen asleep but not crashed, 3% crashed, and 2% had and accident due to drowsy driving. Who is at risk? Young Drivers: One study of drowsy driving accidents states that 55% of the drivers were under 25 years. Of those, 75% were male. Shift Workers and Travelers: People who work overnight or travel across time zones frequently are at higher risk of experiencing Circadian Rhythm Disorders. They are trying to work and function when their body is programed to sleep. Sleep Deprived: Lack of sleep has a serious impact on your ability to pay attention or focus on a task. Consistently getting less than the average of 8 hours your body needs creates partial or cumulative sleep deprivation. Untreated Sleep Disorders: Sleep Apnea, Narcolepsy, R.L.S., and other sleep disorders (untreated) prevent a person from getting enough restful sleep. This leads to excessive daytime sleepiness and increases the risk for drowsy driving accidents by up to 7 times.   Medications / Alcohol: Even over the counter medications can cause drowsiness. Medications that impair a drivers attention should have a warning label. Alcohol naturally makes you sleepy and on its own can cause accidents. Combined with excessive drowsiness its effects are amplified. Signs of Drowsy Driving:   * You don't remember driving the last few miles   * You may drift out of your dennise   * You are unable to focus and your thoughts wander   * You may yawn more often than normal   * You have difficulty keeping your eyes open / nodding off   * Missing traffic signs, speeding, or tailgating  Prevention-   Good sleep hygiene, lifestyle and behavioral choices have the most impact on drowsy driving. There is no substitute for sleep and the average person requires 8 hours nightly. If you find yourself driving drowsy, stop and sleep. Consider the sleep hygiene tips provided during your visit as well. Medication Refill Policy: Refills for all medications require 1 week advance notice. Please have your pharmacy fax a refill request. We are unable to fax, or call in \"controled substance\" medications and you will need to pick these prescriptions up from our office. [employer] Activation    Thank you for requesting access to [employer]. Please follow the instructions below to securely access and download your online medical record. [employer] allows you to send messages to your doctor, view your test results, renew your prescriptions, schedule appointments, and more. How Do I Sign Up? 1. In your internet browser, go to [email or web address]. 2. Click on the First Time User? Click Here link in the Sign In box. You will see the New Member Sign Up page. 3. Enter your [employer] Access Code exactly as it appears below. You will not need to use this code after youve completed the sign-up process. If you do not sign up before the expiration date, you must request a new code.     [employer] Access Code: [email or web address]  Expires: 10/24/2021 10:17 AM (This is the date your [employer] access code will )    4. Enter the last four digits of your Social Security Number (xxxx) and Date of Birth (mm/dd/yyyy) as indicated and click Submit. You will be taken to the next sign-up page. 5. Create a [employer] ID. This will be your [employer] login ID and cannot be changed, so think of one that is secure and easy to remember. 6. Create a [employer] password. You can change your password at any time. 7. Enter your Password Reset Question and Answer. This can be used at a later time if you forget your password. 8. Enter your e-mail address. You will receive e-mail notification when new information is available in [address] 9. Click Sign Up. You can now view and download portions of your medical record. 10. Click the Download Summary menu link to download a portable copy of your medical information. Additional Information    If you have questions, please call [phone]. Remember, [employer] is NOT to be used for urgent needs. For medical emergencies, dial 911.

## 2021-09-09 NOTE — PROGRESS NOTES
217 [facility] [place]. [place], [address]   Tel.  [phone]   Fax. 100 [facility] 60   [place], [address] [facility]   Tel.  [phone]   Fax. [phone] [address]   Tel.  [phone]   Fax. [phone]     Ramiro Roldan (: 1950) is a 78 y.o. male, established patient, seen for positive airway pressure follow-up and evaluation. He was last seen by me on 2020, previously seen by Dr. Maira Vizcaino 2018, prior notes reviewed in detail. Home sleep test 2016 showed AHI of 19.8/hr with a lowest SaO2 of 71%. ASSESSMENT/PLAN:    ICD-10-CM ICD-9-CM    1. BRODIE (obstructive sleep apnea)  G47.33 327.23 AMB SUPPLY ORDER   2. Essential hypertension  I10 401.9    3. Controlled type 2 diabetes mellitus without complication, without long-term current use of insulin (HCC)  E11.9 250.00    4. Adult BMI 35.0-35.9 kg/sq m  Z68.35 V85.35        AHI = 19.8(2016).    On APAP, Respironics :  4-14 cmH2O. Set up 2016. He is adherent with PAP therapy and PAP continues to benefit patient and remains necessary for control of his sleep apnea. His device is affected by the Ade recall. Follow-up and Dispositions    · Return in about 3 months (around 2021) for first adherence on new device. 1. Sleep Apnea - We have discussed the Ade Respironics device recall, the need to register the device with Ade, and I have advised positional therapy if PAP therapy is stopped. He is eligible fore a new device based on age of current device. Orders Placed This Encounter    AMB SUPPLY ORDER     Diagnosis: (G47.33) BRODIE (obstructive sleep apnea)  (primary encounter diagnosis)      Current device is affected by the Ade recall, it is eligible for replacement based on age. New PAP device ordered.   He would prefer the Dreamstation 2 but is willing to get the Resmed device if that is the only device available. Positive Airway Pressure Therapy: Duration of need: 99 months.  APAP Device: Minimum Pressure: 5 cmH2O, Maximum Pressure: 14 cmH2O. Ramp Time: 30 Minutes.     Heated Humidifier  Maurizio Modem Access  Length of Need: 99 months    Mask Fitting evaluation  Mask for patient preference      Full Face Mask 1 every 3 months.  Full Face Mask Cushion 1 per month.  Headgear 1 every 6 months.  Pos Airway pressure chin strap     Tubing with heating element 1 every 3 months.  Filter(s) Disposable 2 per month.  Filter(s) Non-Disposable 1 every 6 months. .   [address] [facility] [address] for Humidifier (Replace) 1 every 6 months. NOBLE AgarwalNP-BC; NPI: [id number]    Electronically signed. Date:- 09/09/21     * Counseling was provided regarding the importance of regular PAP use with emphasis on ensuring sufficient total sleep time, proper sleep hygiene, and safe driving. * Re-enforced proper and regular cleaning for the device. We discussed the risk associated with use of cleaning devices and he will continue with use of dish soap and water as the appropriate cleaning method. * He was asked to contact our office for any problems regarding PAP therapy. 2. Hypertension -  continue on his current regimen, he will continue to monitor his BP and follow up with his PMD for reevaluation/adjustment of medications if warranted. I have reviewed the relationship between hypertension as it relates to sleep-disordered breathing. 3. Type II diabetes -  he continues on his current regimen. I have reviewed the relationship between sleep disordered breathing as it relates to diabetes. 4. Recommended a dedicated weight loss program through appropriate diet and exercise regimen as significant weight reduction has been shown to reduce severity of obstructive sleep apnea.      SUBJECTIVE/OBJECTIVE:    He  is seen today for follow up on PAP device and reports no problems using the device. The following concerns identified:    Drowsiness no Problems exhaling no   Snoring no Forget to put on no   Mask Comfortable yes Can't fall asleep no   Dry Mouth no Mask falls off no   Air Leaking no Frequent awakenings no       He admits that his sleep has improved on PAP therapy using full face mask and heated tubing. He notes he has not been using the device for the full four hours recently due to falling asleep in the recliner watching TV. He has not yet registered the device but will, he is eligible for a new device and would like to replace the recalled one. Review of device download indicated:  Auto  pressure: 4-14 cmH2O; Peak Avg Pressure: 13.8 cmH2O;  Avg. Device Pressure <= 90 %: 14.0 cmH2O     Average % Night in Large Leak:  0.9 - nightly blower time  % Used Days >= 4 hours: 10.  Avg hours used:  2:26. Therapy Apnea Index averaged over PAP use: 8.5 /hr which reflects improved sleep breathing condition. Van Horne Sleepiness Score: 4 and Modified F.O.S.Q. Score Total / 2: 19 which reflects improved sleep quality over therapy time. Sleep Review of Systems: notable for Negative difficulty falling asleep; Positive awakenings at night; Negative early morning headaches; Negative memory problems; Negative concentration issues;  Negative chest pain; Negative shortness of breath; Negative significant joint pain at night; Negative significant muscle pain at night; Negative rashes or itching; Negative heartburn; Negative significant mood issues; 0 afternoon naps per week      Visit Vitals  BP (!) 140/79 (BP 1 Location: Right upper arm, BP Patient Position: Sitting, BP Cuff Size: Large adult)   Pulse 90   Temp 98.2 °F (36.8 °C) (Temporal)   Ht 6' 1\" (1.854 m)   Wt 271 lb (122.9 kg)   SpO2 (!) 87%   BMI 35.75 kg/m²          General:   Alert, oriented, not in acute distress   Eyes:  Anicteric Sclerae; no obvious strabismus   Nose:  No obvious nasal septum deviation    Neck: Midline trachea   Chest/Lungs:  Symmetrical lung expansion, clear lung fields on auscultation    CVS:  Normal rate, regular rhythm,  no JVD   Extremities:  No obvious rashes, no edema    Neuro:  No focal deficits; No obvious tremor    Psych:  Normal affect,  normal countenance     Patient's phone number [phone] (home)  was reviewed and confirmed for accuracy. He gives permission for messages regarding results and appointments to be left at that number. An electronic signature was used to authenticate this note.     -- Nadia Garcia NP, [facility]  09/09/21

## 2021-10-07 ENCOUNTER — TELEPHONE (OUTPATIENT)
Dept: SLEEP MEDICINE | Age: 71
End: 2021-10-07

## 2021-10-07 NOTE — TELEPHONE ENCOUNTER
Left voicemail for patient regarding receiving new device. Advised him to reach out to DME company regarding his new device.      AJ Banerjee-BC, [facility]   Nurse Practitioner  [facility] [address] [facility]

## 2021-12-29 ENCOUNTER — VIRTUAL VISIT (OUTPATIENT)
Dept: SLEEP MEDICINE | Age: 71
End: 2021-12-29
Payer: COMMERCIAL

## 2021-12-29 ENCOUNTER — DOCUMENTATION ONLY (OUTPATIENT)
Dept: SLEEP MEDICINE | Age: 71
End: 2021-12-29

## 2021-12-29 DIAGNOSIS — G47.33 OSA (OBSTRUCTIVE SLEEP APNEA): Primary | ICD-10-CM

## 2021-12-29 DIAGNOSIS — I10 ESSENTIAL HYPERTENSION: ICD-10-CM

## 2021-12-29 DIAGNOSIS — E11.9 CONTROLLED TYPE 2 DIABETES MELLITUS WITHOUT COMPLICATION, WITHOUT LONG-TERM CURRENT USE OF INSULIN (HCC): ICD-10-CM

## 2021-12-29 PROCEDURE — 99213 OFFICE O/P EST LOW 20 MIN: CPT | Performed by: NURSE PRACTITIONER

## 2021-12-29 NOTE — PROGRESS NOTES
Miguel Gracia (: 1950) is a 73 y.o. male, established patient, seen for positive airway pressure follow-up, he was last seen by me on 2021, previously seen by Dr. Jordan David 2018, prior notes reviewed in detail. Home sleep test 2016 showed AHI of 19.8/hr with a lowest SaO2 of 71%. ASSESSMENT/PLAN:    ICD-10-CM ICD-9-CM    1. BRODIE (obstructive sleep apnea)  G47.33 327.23 AMB SUPPLY ORDER   2. Essential hypertension  I10 401.9    3. Controlled type 2 diabetes mellitus without complication, without long-term current use of insulin (HCC)  E11.9 250.00    4. Adult BMI 35.0-35.9 kg/sq m  Z68.35 V85.35        AHI = 19.8(2016).    On APAP, Respironics :  4-14 cmH2O. Set up 2016. He is not compliant with PAP therapy although when used PAP shows benefit to the patient and remains necessary for control of his sleep apnea. There is continued clinical benefit from the hours of use demonstrated by AHI reduced from 19.8/hr to 5.5/hr. His device is affected by the Ade recall, review of  Care  shows no new device set up at this time. Follow-up and Dispositions    · Return in about 1 year (around 2022) for annual follow up . 1. Sleep Apnea -   Continue on current pressures. We have discussed the [employer] device recall. His device shows registered in the Ade system. He has been using the device but need supplies. * Supplies ordered - full face mask and heated tubing. He will order mask supplies online for immediate solution and follow up with DME. Orders Placed This Encounter    AMB SUPPLY ORDER     Diagnosis: (G47.33) BRODIE (obstructive sleep apnea)  (primary encounter diagnosis)     Replacement Supplies for Positive Airway Pressure Therapy Device:   Duration of need: 99 months.  Full Face Mask 1 every 3 months.  Full Face Mask Cushion 1 per month.  Headgear 1 every 6 months.    Pos Airway pressure chin stra    [id number] Tubing with heating element 1 every 3 months.  Filter(s) Disposable 2 per month.  Filter(s) Non-Disposable 1 every 6 months. .   [address] [facility] for Humidifier (Replace) 1 every 6 months. NOBLE LancasterADRIAN-BC; NPI: [id number]    Electronically signed. Date:- 12/29/21       * He was asked to contact our office for any problems regarding PAP therapy and when he receives a new device. 2. Hypertension -  continue on his current regimen, he will continue to monitor his BP.     3. Type II diabetes -  he continues on his current regimen, recent change in dosage seems to be working well. 4. Recommended a dedicated weight loss program through appropriate diet and exercise regimen as significant weight reduction has been shown to reduce severity of obstructive sleep apnea. SUBJECTIVE/OBJECTIVE:    He  is seen today for follow up on PAP device and reports some problems using the device. The following concerns identified:    Drowsiness no Problems exhaling no   Snoring no Forget to put on no   Mask Comfortable yes Can't fall asleep yes   Dry Mouth no Mask falls off no   Air Leaking yes Frequent awakenings yes       He admits that his sleep had improved on PAP therapy using full face mask (F&P simplus Medium) and heated tubing. He has been waiting for supplies for 3 months. A new device was ordered at last visit based on the age of his current device. [employer] refused to set up new device stating that patient had to wait for gaby to replace. Per insurance guidelines device is eligible for replacement at this time. He has not been having recall related symptoms and has been trying to use device but noise from mask leak and worn out seals is keeping him awake. He has been sleeping in the recliner when he can't fall asleep in bed with mask. He has tried melatonin with some benefit helping with falling asleep.     Review of device download indicated:  Auto pressure: 4-14 cmH2O;Peak Avg Pressure: 14.0 cmH2O;  Avg. Device Pressure <= 90 %: 14.0 cmH2O    Average % Night in Large Leak:  0.3  % Used Days >= 4 hours: 8. Avg hours used:  1:44. Therapy Apnea Index averaged over PAP use: 5.5 /hr which reflects improved sleep breathing condition. Perkins Sleepiness Score: 7 and Modified F.O.S.Q. Score Total / 2: 19.5 which reflects improved sleep quality over therapy time. Sleep Review of Systems: notable for Positive difficulty falling asleep; Positive awakenings at night; Negative early morning headaches; Negative memory problems; Negative concentration issues; Negative chest pain; Negative shortness of breath; Negative significant joint pain at night; Negative rashes or itching; Negative heartburn; Negative significant mood issues; Vitals reported by patient     Patient-Reported Vitals 12/29/2021   Patient-Reported Weight 270 lb   Patient-Reported Systolic  427   Patient-Reported Diastolic 79      Calculated BMI 35    Physical Exam completed by visual and auditory observation of patient with verbal input from patient. General:   Alert, oriented, not in acute distress   Eyes:  Anicteric Sclerae; no obvious strabismus   Nose:  No obvious nasal septum deviation    Neck:   Midline trachea, no visible mass   Chest/Lungs:  Respiratory effort normal, no visualized signs of difficulty breathing or respiratory distress   CVS:  No JVD   Extremities:  No obvious rashes noted on face, neck, or hands   Neuro:  No facial asymmetry, no focal deficits; no obvious tremor    Psych:  Normal affect,  normal countenance     Orvel Soda is being evaluated by a Virtual Visit (video visit) encounter to address concerns as mentioned above. A caregiver was present when appropriate. Due to this being a TeleHealth encounter ([facility]), evaluation of the following organ systems was limited: Vitals/Constitutional/EENT/Resp/CV/GI//MS/Neuro/Skin/Heme-Lymph-Imm. Pursuant to the emergency declaration under the [address] [facility], [address] [place] [address] authority and the [employer] Act, this Virtual Visit was conducted with patient's (and/or legal guardian's) consent, to reduce the patient's risk of exposure to COVID-19 and provide necessary medical care. Patient identification was verified at the start of the visit: YES using name and date of birth. Patient's phone number [phone] (cell) was confirmed for accuracy. He gives permission for messages regarding results and appointments to be left at that number. Services were provided through a video synchronous discussion virtually to substitute for in-person clinic visit. I was at home while conducting this encounter, patient located at their home or alternate location of their choice. An electronic signature was used to authenticate this note.     -- Collin Carpenter NP, [facility]  12/29/21

## 2021-12-29 NOTE — PATIENT INSTRUCTIONS
[address] [facility] [place]. [place], [address]  Tel.  [phone]  Fax. 100 [facility] 60  [place], [address] [facility]  Tel.  [phone]  Fax. [phone] [address]  Tel.  [phone]  Fax. [phone]     Learning About CPAP for Sleep Apnea  What is CPAP? CPAP is a small machine that you use at home every night while you sleep. It increases air pressure in your throat to keep your airway open. When you have sleep apnea, this can help you sleep better so you feel much better. CPAP stands for \"continuous positive airway pressure. \"  The CPAP machine will have one of the following:  · A mask that covers your nose and mouth  · Prongs that fit into your nose  · A mask that covers your nose only, the most common type. This type is called NCPAP. The N stands for \"nasal.\"  Why is it done? CPAP is usually the best treatment for obstructive sleep apnea. It is the first treatment choice and the most widely used. Your doctor may suggest CPAP if you have:  · Moderate to severe sleep apnea. · Sleep apnea and coronary artery disease (CAD) or heart failure. How does it help? · CPAP can help you have more normal sleep, so you feel less sleepy and more alert during the daytime. · CPAP may help keep heart failure or other heart problems from getting worse. · NCPAP may help lower your blood pressure. · If you use CPAP, your bed partner may also sleep better because you are not snoring or restless. What are the side effects? Some people who use CPAP have:  · A dry or stuffy nose and a sore throat. · Irritated skin on the face. · Sore eyes. · Bloating. If you have any of these problems, work with your doctor to fix them. Here are some things you can try:  · Be sure the mask or nasal prongs fit well. · See if your doctor can adjust the pressure of your CPAP. · If your nose is dry, try a humidifier.   · If your nose is runny or stuffy, try decongestant medicine or a steroid nasal spray. If these things do not help, you might try a different type of machine. Some machines have air pressure that adjusts on its own. Others have air pressures that are different when you breathe in than when you breathe out. This may reduce discomfort caused by too much pressure in your nose. Where can you learn more? Go to Seafile.be  Enter Lisset Levine in the search box to learn more about \"Learning About CPAP for Sleep Apnea. \"   © 8912-2238 Healthwise, Incorporated. Care instructions adapted under license by [facility] [employer] (which disclaims liability or warranty for this information). This care instruction is for use with your licensed healthcare professional. If you have questions about a medical condition or this instruction, always ask your healthcare professional. [address] any warranty or liability for your use of this information. Content Version: 2.2.37273; Last Revised: January 11, 2010  PROPER SLEEP HYGIENE    What to avoid  · Do not have drinks with caffeine, such as coffee or black tea, for 8 hours before bed. · Do not smoke or use other types of tobacco near bedtime. Nicotine is a stimulant and can keep you awake. · Avoid drinking alcohol late in the evening, because it can cause you to wake in the middle of the night. · Do not eat a big meal close to bedtime. If you are hungry, eat a light snack. · Do not drink a lot of water close to bedtime, because the need to urinate may wake you up during the night. · Do not read or watch TV in bed. Use the bed only for sleeping and sexual activity. What to try  · Go to bed at the same time every night, and wake up at the same time every morning. Do not take naps during the day. · Keep your bedroom quiet, dark, and cool. · Get regular exercise, but not within 3 to 4 hours of your bedtime. .  · Sleep on a comfortable pillow and mattress.   · If watching the clock makes you anxious, turn it facing away from you so you cannot see the time. · If you worry when you lie down, start a worry book. Well before bedtime, write down your worries, and then set the book and your concerns aside. · Try meditation or other relaxation techniques before you go to bed. · If you cannot fall asleep, get up and go to another room until you feel sleepy. Do something relaxing. Repeat your bedtime routine before you go to bed again. · Make your house quiet and calm about an hour before bedtime. Turn down the lights, turn off the TV, log off the computer, and turn down the volume on music. This can help you relax after a busy day. Drowsy Driving: The [employer] cites drowsiness as a causing factor in more than 957,953 police reported crashes annually, resulting in 76,000 injuries and 1,500 deaths. Other surveys suggest 55% of people polled have driven while drowsy in the past year, 23% had fallen asleep but not crashed, 3% crashed, and 2% had and accident due to drowsy driving. Who is at risk? Young Drivers: One study of drowsy driving accidents states that 55% of the drivers were under 25 years. Of those, 75% were male. Shift Workers and Travelers: People who work overnight or travel across time zones frequently are at higher risk of experiencing Circadian Rhythm Disorders. They are trying to work and function when their body is programed to sleep. Sleep Deprived: Lack of sleep has a serious impact on your ability to pay attention or focus on a task. Consistently getting less than the average of 8 hours your body needs creates partial or cumulative sleep deprivation. Untreated Sleep Disorders: Sleep Apnea, Narcolepsy, R.L.S., and other sleep disorders (untreated) prevent a person from getting enough restful sleep. This leads to excessive daytime sleepiness and increases the risk for drowsy driving accidents by up to 7 times.   Medications / Alcohol: Even over the counter medications can cause drowsiness. Medications that impair a drivers attention should have a warning label. Alcohol naturally makes you sleepy and on its own can cause accidents. Combined with excessive drowsiness its effects are amplified. Signs of Drowsy Driving:   * You don't remember driving the last few miles   * You may drift out of your dennise   * You are unable to focus and your thoughts wander   * You may yawn more often than normal   * You have difficulty keeping your eyes open / nodding off   * Missing traffic signs, speeding, or tailgating  Prevention-   Good sleep hygiene, lifestyle and behavioral choices have the most impact on drowsy driving. There is no substitute for sleep and the average person requires 8 hours nightly. If you find yourself driving drowsy, stop and sleep. Consider the sleep hygiene tips provided during your visit as well. Medication Refill Policy: Refills for all medications require 1 week advance notice. Please have your pharmacy fax a refill request. We are unable to fax, or call in \"controled substance\" medications and you will need to pick these prescriptions up from our office. Sproxil Activation    Thank you for requesting access to Sproxil. Please follow the instructions below to securely access and download your online medical record. Sproxil allows you to send messages to your doctor, view your test results, renew your prescriptions, schedule appointments, and more. How Do I Sign Up? 1. In your internet browser, go to [email or web address]. 2. Click on the First Time User? Click Here link in the Sign In box. You will see the New Member Sign Up page. 3. Enter your Sproxil Access Code exactly as it appears below. You will not need to use this code after youve completed the sign-up process. If you do not sign up before the expiration date, you must request a new code.     Sproxil Access Code: Paresh Louis  Expires: 2022  1:27 PM (This is the date your Learndot access code will )    4. Enter the last four digits of your Social Security Number (xxxx) and Date of Birth (mm/dd/yyyy) as indicated and click Submit. You will be taken to the next sign-up page. 5. Create a Learndot ID. This will be your Learndot login ID and cannot be changed, so think of one that is secure and easy to remember. 6. Create a Learndot password. You can change your password at any time. 7. Enter your Password Reset Question and Answer. This can be used at a later time if you forget your password. 8. Enter your e-mail address. You will receive e-mail notification when new information is available in [address] 9. Click Sign Up. You can now view and download portions of your medical record. 10. Click the Download Summary menu link to download a portable copy of your medical information. Additional Information    If you have questions, please call [phone]. Remember, Learndot is NOT to be used for urgent needs. For medical emergencies, dial 911.

## 2022-01-18 ENCOUNTER — TELEPHONE (OUTPATIENT)
Dept: SLEEP MEDICINE | Age: 72
End: 2022-01-18

## 2022-03-19 PROBLEM — E66.01 SEVERE OBESITY (BMI 35.0-39.9) WITH COMORBIDITY (HCC): Status: ACTIVE | Noted: 2018-04-11

## 2022-12-28 ENCOUNTER — VIRTUAL VISIT (OUTPATIENT)
Dept: SLEEP MEDICINE | Age: 72
End: 2022-12-28
Payer: COMMERCIAL

## 2022-12-28 DIAGNOSIS — G47.33 OSA (OBSTRUCTIVE SLEEP APNEA): Primary | ICD-10-CM

## 2022-12-28 PROCEDURE — 99213 OFFICE O/P EST LOW 20 MIN: CPT | Performed by: NURSE PRACTITIONER

## 2022-12-28 PROCEDURE — 1123F ACP DISCUSS/DSCN MKR DOCD: CPT | Performed by: NURSE PRACTITIONER

## 2022-12-28 NOTE — PATIENT INSTRUCTIONS
[address] [facility] [address] [place]. [place], [address]  Tel.  [phone]  Fax. 100 [facility] 60  [place], [address] [facility]  Tel.  [phone]  Fax. [phone] [address]  Tel.  [phone]  Fax. [phone]     Learning About CPAP for Sleep Apnea  What is CPAP? CPAP is a small machine that you use at home every night while you sleep. It increases air pressure in your throat to keep your airway open. When you have sleep apnea, this can help you sleep better so you feel much better. CPAP stands for \"continuous positive airway pressure. \"  The CPAP machine will have one of the following:  A mask that covers your nose and mouth  Prongs that fit into your nose  A mask that covers your nose only, the most common type. This type is called NCPAP. The N stands for \"nasal.\"  Why is it done? CPAP is usually the best treatment for obstructive sleep apnea. It is the first treatment choice and the most widely used. Your doctor may suggest CPAP if you have: Moderate to severe sleep apnea. Sleep apnea and coronary artery disease (CAD) or heart failure. How does it help? CPAP can help you have more normal sleep, so you feel less sleepy and more alert during the daytime. CPAP may help keep heart failure or other heart problems from getting worse. NCPAP may help lower your blood pressure. If you use CPAP, your bed partner may also sleep better because you are not snoring or restless. What are the side effects? Some people who use CPAP have:  A dry or stuffy nose and a sore throat. Irritated skin on the face. Sore eyes. Bloating. If you have any of these problems, work with your doctor to fix them. Here are some things you can try:  Be sure the mask or nasal prongs fit well. See if your doctor can adjust the pressure of your CPAP. If your nose is dry, try a humidifier.   If your nose is runny or stuffy, try decongestant medicine or a steroid nasal spray. If these things do not help, you might try a different type of machine. Some machines have air pressure that adjusts on its own. Others have air pressures that are different when you breathe in than when you breathe out. This may reduce discomfort caused by too much pressure in your nose. Where can you learn more? Go to WorkVoices.be  Enter Jenn Spotted in the search box to learn more about \"Learning About CPAP for Sleep Apnea. \"   © 0632-3043 Healthwise, Incorporated. Care instructions adapted under license by [address] [place] [address] [employer] (which disclaims liability or warranty for this information). This care instruction is for use with your licensed healthcare professional. If you have questions about a medical condition or this instruction, always ask your healthcare professional. [address] any warranty or liability for your use of this information. Content Version: 5.5.17171; Last Revised: January 11, 2010  PROPER SLEEP HYGIENE    What to avoid  Do not have drinks with caffeine, such as coffee or black tea, for 8 hours before bed. Do not smoke or use other types of tobacco near bedtime. Nicotine is a stimulant and can keep you awake. Avoid drinking alcohol late in the evening, because it can cause you to wake in the middle of the night. Do not eat a big meal close to bedtime. If you are hungry, eat a light snack. Do not drink a lot of water close to bedtime, because the need to urinate may wake you up during the night. Do not read or watch TV in bed. Use the bed only for sleeping and sexual activity. What to try  Go to bed at the same time every night, and wake up at the same time every morning. Do not take naps during the day. Keep your bedroom quiet, dark, and cool. Get regular exercise, but not within 3 to 4 hours of your bedtime. .  Sleep on a comfortable pillow and mattress.   If watching the clock makes you anxious, turn it facing away from you so you cannot see the time. If you worry when you lie down, start a worry book. Well before bedtime, write down your worries, and then set the book and your concerns aside. Try meditation or other relaxation techniques before you go to bed. If you cannot fall asleep, get up and go to another room until you feel sleepy. Do something relaxing. Repeat your bedtime routine before you go to bed again. Make your house quiet and calm about an hour before bedtime. Turn down the lights, turn off the TV, log off the computer, and turn down the volume on music. This can help you relax after a busy day. Drowsy Driving: [employer] cites drowsiness as a causing factor in more than 946,761 police reported crashes annually, resulting in 76,000 injuries and 1,500 deaths. Other surveys suggest 55% of people polled have driven while drowsy in the past year, 23% had fallen asleep but not crashed, 3% crashed, and 2% had and accident due to drowsy driving. Who is at risk? Young Drivers: One study of drowsy driving accidents states that 55% of the drivers were under 25 years. Of those, 75% were male. Shift Workers and Travelers: People who work overnight or travel across time zones frequently are at higher risk of experiencing Circadian Rhythm Disorders. They are trying to work and function when their body is programed to sleep. Sleep Deprived: Lack of sleep has a serious impact on your ability to pay attention or focus on a task. Consistently getting less than the average of 8 hours your body needs creates partial or cumulative sleep deprivation. Untreated Sleep Disorders: Sleep Apnea, Narcolepsy, R.L.S., and other sleep disorders (untreated) prevent a person from getting enough restful sleep. This leads to excessive daytime sleepiness and increases the risk for drowsy driving accidents by up to 7 times.   Medications / Alcohol: Even over the counter medications can cause drowsiness. Medications that impair a drivers attention should have a warning label. Alcohol naturally makes you sleepy and on its own can cause accidents. Combined with excessive drowsiness its effects are amplified. Signs of Drowsy Driving:   * You don't remember driving the last few miles   * You may drift out of your dennise   * You are unable to focus and your thoughts wander   * You may yawn more often than normal   * You have difficulty keeping your eyes open / nodding off   * Missing traffic signs, speeding, or tailgating  Prevention-   Good sleep hygiene, lifestyle and behavioral choices have the most impact on drowsy driving. There is no substitute for sleep and the average person requires 8 hours nightly. If you find yourself driving drowsy, stop and sleep. Consider the sleep hygiene tips provided during your visit as well. Medication Refill Policy: Refills for all medications require 1 week advance notice. Please have your pharmacy fax a refill request. We are unable to fax, or call in \"controled substance\" medications and you will need to pick these prescriptions up from our office. ShowUhow Activation    Thank you for requesting access to ShowUhow. Please follow the instructions below to securely access and download your online medical record. ShowUhow allows you to send messages to your doctor, view your test results, renew your prescriptions, schedule appointments, and more. How Do I Sign Up? In your internet browser, go to [email or web address]. Click on the First Time User? Click Here link in the Sign In box. You will see the New Member Sign Up page. Enter your ShowUhow Access Code exactly as it appears below. You will not need to use this code after youve completed the sign-up process. If you do not sign up before the expiration date, you must request a new code.     ShowUhow Access Code: [email or web address]  Expires: 2/11/2023 10:35 AM (This is the date your ShowUhow access code will )    Enter the last four digits of your Social Security Number (xxxx) and Date of Birth (mm/dd/yyyy) as indicated and click Submit. You will be taken to the next sign-up page. Create a [employer] Self Represent[employer] Document Preparation ID. This will be your [employer] Preparation login ID and cannot be changed, so think of one that is secure and easy to remember. Create a [employer] Represent[employer] Document Preparation password. You can change your password at any time. Enter your Password Reset Question and Answer. This can be used at a later time if you forget your password. Enter your e-mail address. You will receive e-mail notification when new information is available in [address] Click Sign Up. You can now view and download portions of your medical record. Click the National [employer] Network link to download a portable copy of your medical information. Additional Information    If you have questions, please call [phone]. Remember, [employer] Self [employer]ation Document Preparation is NOT to be used for urgent needs. For medical emergencies, dial 911.

## 2022-12-28 NOTE — PROGRESS NOTES
Mu Hickman (: 1950) is a 81 y.o. male, established patient, seen for positive airway pressure follow-up, he was last seen by me on 2021, previously seen by Dr. Jeanna Tran on 2018, prior notes reviewed in detail. Home sleep test 2016 showed AHI of 19.8/hr with a lowest SaO2 of 71%. ASSESSMENT/PLAN:    ICD-10-CM ICD-9-CM    1. BRODIE (obstructive sleep apnea)  G47.33 327.23 AMB SUPPLY ORDER      2. Adult BMI 33.0-33.9 kg/sq m  Z68.33 V85.33           AHI = 19.8(2016). On Respironics APAP :  4-14 cmH2O. Set up 2016. New DS 2 Set up 2022. He is adherent with PAP therapy and PAP continues to benefit patient and remains necessary for control of his sleep apnea. Follow-up and Dispositions    Return in about 1 year (around 2023) for annual follow up . Sleep Apnea -    Continue on current pressures    * Supplies ordered - full face  mask and heated tubing    Orders Placed This Encounter    AMB SUPPLY ORDER     Diagnosis: (G47.33) BRODIE (obstructive sleep apnea)  (primary encounter diagnosis)     Dreamstation 2     Replacement Supplies for Positive Airway Pressure Therapy Device:   Duration of need: 99 months.  Full Face Mask 1 every 3 months.  Full Face Mask Cushion 1 per month.  Headgear 1 every 6 months.  Pos Airway pressure chin strap     Tubing with heating element 1 every 3 months.  Filter(s) Disposable 2 per month. Dreamstation 2    Filter(s) Non-Disposable 1 every 6 months. .   [address] [facility] for Humidifier (Replace) 1 every 6 months. Dreamstation 2       OLEG Ojeda; NPI: [id number]    Electronically signed. Date:- 22     *  Counseling was provided regarding the importance of regular PAP use with emphasis on ensuring sufficient total sleep time, proper sleep hygiene, and safe driving. * Re-enforced proper and regular cleaning for the device.  We discussed the risk associated with use of cleaning devices and he will continue with use of dish soap and water as the appropriate cleaning method. * He was asked to contact our office for any problems regarding PAP therapy. 2. Encouraged continued weight management program through appropriate diet and exercise regimen as significant weight reduction has been shown to reduce severity of obstructive sleep apnea. He is walking. SUBJECTIVE/OBJECTIVE:    He  is seen today for follow up on PAP device and reports no problems using the device. The following concerns reviewed:    Drowsiness no Problems exhaling no   Snoring no Forget to put on no   Mask Comfortable yes Can't fall asleep no   Dry Mouth no Mask falls off no   Air Leaking no Frequent awakenings no       He admits that his sleep has improved on PAP therapy using full face mask and heated tubing. He notes he is doing well with the new dreamstation 2 . He was having issues with getting supplies but the recently were received, the air leak is better since now with new supplies  . He notes he sometimes falls asleep downstairs in the recliner and will come up to bed about 4 hours before he has to get up for work . 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 He denies issues with daytime sleepiness or driving. Review of device download indicated:  Auto pressure: 4-14 cmH2O; Peak Avg Pressure: 11.3 cmH2O;  Avg. Device Pressure <= 90 %: 9.9 cmH2O    Average % Night in Large Leak:  0.0  % Used Days >= 4 hours: 70. Avg hours used:  3:51. Therapy Apnea Index averaged over PAP use: 3.0 /hr which reflects improved sleep breathing condition. Ney Sleepiness Score: 4 and Modified F.O.S.Q. Score Total / 2: 18.5 which reflects improved sleep quality over therapy time. Sleep Review of Systems: notable for Negative difficulty falling asleep; Positive awakenings at night; Negative early morning headaches; Negative memory problems; Negative concentration issues;  Negative chest pain; Negative shortness of breath; Negative significant joint pain at night; Negative rashes or itching; Negative heartburn; Negative significant mood issues; 0 afternoon naps per week    Vitals reported by patient   Patient-Reported Vitals 12/28/2022   Patient-Reported Weight 255   Patient-Reported Systolic  808   Patient-Reported Diastolic 70      Calculated BMI 33    Physical Exam completed by visual and auditory observation of patient with verbal input from patient. General:   Alert, oriented, not in acute distress   Eyes:  Anicteric Sclerae; no obvious strabismus   Nose:  No obvious nasal septum deviation    Neck:   Midline trachea, no visible mass   Chest/Lungs:  Respiratory effort normal, no visualized signs of difficulty breathing or respiratory distress   CVS:  No JVD   Extremities:  No obvious rashes noted on face, neck, or hands   Neuro:  No facial asymmetry, no focal deficits; no obvious tremor    Psych:  Normal affect,  normal countenance     Francoise Drake, was evaluated through a synchronous (real-time) audio-video encounter. The patient (or guardian if applicable) is aware that this is a billable service, which includes applicable co-pays. This Virtual Visit was conducted with patient's (and/or legal guardian's) consent. The visit was conducted pursuant to the emergency declaration under the [address] [place] [address] and the [employer] and [employer] General Act. Patient identification was verified, and a caregiver was present when appropriate.   The patient was located at: Home: [address] [place] [address]  The provider was located at: Home: [place]      On this date 12/28/2022 I have spent 20 minutes reviewing previous notes, test results and face to face with the patient discussing the diagnosis and importance of compliance with the treatment plan as well as documenting on the day of the visit. Patient's phone number [phone] (cell) was confirmed for accuracy. He gives permission for messages regarding results and appointments to be left at that number. An electronic signature was used to authenticate this note.     -- Chloé Albarado NP, [facility]  12/28/22

## 2023-01-03 ENCOUNTER — TELEPHONE (OUTPATIENT)
Dept: SLEEP MEDICINE | Age: 73
End: 2023-01-03

## 2023-01-03 NOTE — TELEPHONE ENCOUNTER
Patient wife Whit December called to cancel appt for 2024. Wife is not on PHI, called patient to confirm and LVM to reschedule VV appt for 2024.

## 2023-09-11 ENCOUNTER — HOSPITAL ENCOUNTER (OUTPATIENT)
Facility: HOSPITAL | Age: 73
Discharge: HOME | End: 2023-09-14

## 2023-09-11 VITALS
WEIGHT: 254.6 LBS | TEMPERATURE: 98.1 F | SYSTOLIC BLOOD PRESSURE: 144 MMHG | RESPIRATION RATE: 18 BRPM | HEIGHT: 72 IN | BODY MASS INDEX: 34.48 KG/M2 | DIASTOLIC BLOOD PRESSURE: 77 MMHG | HEART RATE: 79 BPM

## 2023-09-11 RX ORDER — KRILL/OM-3/DHA/EPA/PHOSPHO/AST 500MG-86MG
500 CAPSULE ORAL DAILY
COMMUNITY

## 2023-09-11 NOTE — PERIOP NOTE
[address] INSTRUCTIONS    Surgery Date:   9/18/23    Your surgeon's office or [facility] staff will call you between 4 PM- 8 PM the day before surgery with your arrival time. If your surgery is on a Monday, you will receive a call the preceding Friday. Please report to [facility]Admitting on the 1st floor. Bring your insurance card, photo identification, and any copayment ( if applicable). If you are going home the same day of your surgery, you must have a responsible adult to drive you home. You need to have a responsible adult to stay with you the first 24 hours after surgery and you should not drive a car for 24 hours following your surgery. Nothing to eat or drink after midnight the night before surgery. This includes no water, gum, mints, coffee, juice, etc.  Please note special instructions, if applicable, below for medications. Do NOT drink alcohol or smoke 24 hours before surgery. STOP smoking for 14 days prior as it helps with breathing and healing after surgery. If you are being admitted to the hospital, please leave personal belongings/luggage in your car until you have an assigned hospital room number. Please wear comfortable clothes. Wear your glasses instead of contacts. We ask that all money, jewelry and valuables be left at home. Wear no make up, particularly mascara, the day of surgery. All body piercings, rings, and jewelry need to be removed and left at home. Please remove any nail polish or artificial nails from your fingernails. Please wear your hair loose or down. Please no pony-tails, buns, or any metal hair accessories. If you shower the morning of surgery, please do not apply any lotions or powders afterwards. You may wear deodorant, unless having breast surgery. Do not shave any body area within 24 hours of your surgery. Please follow all instructions to avoid any potential surgical cancellation.   Should your physical condition change,

## 2023-09-12 NOTE — PERIOP NOTE
CALLED DR. Apple Taylor (PCP) OFFICE  [phone] , FOR SIGNED EKG DONE 8/2023 AND LAST OV NOTE. LEFT MESSAGE FOR DR. Terrance Schafer.

## 2023-09-18 ENCOUNTER — ANESTHESIA EVENT (OUTPATIENT)
Facility: HOSPITAL | Age: 73
End: 2023-09-18
Payer: COMMERCIAL

## 2023-09-18 ENCOUNTER — HOSPITAL ENCOUNTER (OUTPATIENT)
Facility: HOSPITAL | Age: 73
Setting detail: OUTPATIENT SURGERY
Discharge: HOME | End: 2023-09-18
Attending: OTOLARYNGOLOGY | Admitting: OTOLARYNGOLOGY
Payer: COMMERCIAL

## 2023-09-18 ENCOUNTER — ANESTHESIA (OUTPATIENT)
Facility: HOSPITAL | Age: 73
End: 2023-09-18
Payer: COMMERCIAL

## 2023-09-18 VITALS
RESPIRATION RATE: 16 BRPM | BODY MASS INDEX: 33.18 KG/M2 | HEART RATE: 84 BPM | SYSTOLIC BLOOD PRESSURE: 167 MMHG | DIASTOLIC BLOOD PRESSURE: 84 MMHG | WEIGHT: 245 LBS | HEIGHT: 72 IN | TEMPERATURE: 97.9 F | OXYGEN SATURATION: 95 %

## 2023-09-18 LAB
GLUCOSE BLD STRIP.AUTO-MCNC: 144 MG/DL (ref 65–117)
GLUCOSE BLD STRIP.AUTO-MCNC: 155 MG/DL (ref 65–117)
SERVICE CMNT-IMP: ABNORMAL
SERVICE CMNT-IMP: ABNORMAL

## 2023-09-18 PROCEDURE — 7100000000 HC PACU RECOVERY - FIRST 15 MIN: Performed by: OTOLARYNGOLOGY

## 2023-09-18 PROCEDURE — 3700000001 HC ADD 15 MINUTES (ANESTHESIA): Performed by: OTOLARYNGOLOGY

## 2023-09-18 PROCEDURE — 2500000003 HC RX 250 WO HCPCS: Performed by: OTOLARYNGOLOGY

## 2023-09-18 PROCEDURE — 7100000001 HC PACU RECOVERY - ADDTL 15 MIN: Performed by: OTOLARYNGOLOGY

## 2023-09-18 PROCEDURE — 3600000003 HC SURGERY LEVEL 3 BASE: Performed by: OTOLARYNGOLOGY

## 2023-09-18 PROCEDURE — 6370000000 HC RX 637 (ALT 250 FOR IP): Performed by: OTOLARYNGOLOGY

## 2023-09-18 PROCEDURE — 2709999900 HC NON-CHARGEABLE SUPPLY: Performed by: OTOLARYNGOLOGY

## 2023-09-18 PROCEDURE — 2500000003 HC RX 250 WO HCPCS: Performed by: NURSE ANESTHETIST, CERTIFIED REGISTERED

## 2023-09-18 PROCEDURE — 2580000003 HC RX 258: Performed by: NURSE ANESTHETIST, CERTIFIED REGISTERED

## 2023-09-18 PROCEDURE — 6360000002 HC RX W HCPCS: Performed by: NURSE ANESTHETIST, CERTIFIED REGISTERED

## 2023-09-18 PROCEDURE — 82962 GLUCOSE BLOOD TEST: CPT

## 2023-09-18 PROCEDURE — 3700000000 HC ANESTHESIA ATTENDED CARE: Performed by: OTOLARYNGOLOGY

## 2023-09-18 PROCEDURE — 2720000010 HC SURG SUPPLY STERILE: Performed by: OTOLARYNGOLOGY

## 2023-09-18 PROCEDURE — 3600000013 HC SURGERY LEVEL 3 ADDTL 15MIN: Performed by: OTOLARYNGOLOGY

## 2023-09-18 RX ORDER — PHENYLEPHRINE HCL IN 0.9% NACL 0.4MG/10ML
SYRINGE (ML) INTRAVENOUS PRN
Status: DISCONTINUED | OUTPATIENT
Start: 2023-09-18 | End: 2023-09-18 | Stop reason: SDUPTHER

## 2023-09-18 RX ORDER — ONDANSETRON 2 MG/ML
INJECTION INTRAMUSCULAR; INTRAVENOUS PRN
Status: DISCONTINUED | OUTPATIENT
Start: 2023-09-18 | End: 2023-09-18 | Stop reason: SDUPTHER

## 2023-09-18 RX ORDER — SODIUM CHLORIDE, SODIUM LACTATE, POTASSIUM CHLORIDE, CALCIUM CHLORIDE 600; 310; 30; 20 MG/100ML; MG/100ML; MG/100ML; MG/100ML
INJECTION, SOLUTION INTRAVENOUS CONTINUOUS PRN
Status: DISCONTINUED | OUTPATIENT
Start: 2023-09-18 | End: 2023-09-18 | Stop reason: SDUPTHER

## 2023-09-18 RX ORDER — OXYCODONE HYDROCHLORIDE 5 MG/1
5 TABLET ORAL
Status: CANCELLED | OUTPATIENT
Start: 2023-09-18 | End: 2023-09-19

## 2023-09-18 RX ORDER — ONDANSETRON 2 MG/ML
4 INJECTION INTRAMUSCULAR; INTRAVENOUS
Status: CANCELLED | OUTPATIENT
Start: 2023-09-18 | End: 2023-09-19

## 2023-09-18 RX ORDER — SUCCINYLCHOLINE/SOD CL,ISO/PF 200MG/10ML
SYRINGE (ML) INTRAVENOUS PRN
Status: DISCONTINUED | OUTPATIENT
Start: 2023-09-18 | End: 2023-09-18 | Stop reason: SDUPTHER

## 2023-09-18 RX ORDER — DEXAMETHASONE SODIUM PHOSPHATE 4 MG/ML
INJECTION, SOLUTION INTRA-ARTICULAR; INTRALESIONAL; INTRAMUSCULAR; INTRAVENOUS; SOFT TISSUE PRN
Status: DISCONTINUED | OUTPATIENT
Start: 2023-09-18 | End: 2023-09-18 | Stop reason: SDUPTHER

## 2023-09-18 RX ORDER — SODIUM CHLORIDE 0.9 % (FLUSH) 0.9 %
5-40 SYRINGE (ML) INJECTION EVERY 12 HOURS SCHEDULED
Status: CANCELLED | OUTPATIENT
Start: 2023-09-18

## 2023-09-18 RX ORDER — LIDOCAINE HYDROCHLORIDE 20 MG/ML
INJECTION, SOLUTION EPIDURAL; INFILTRATION; INTRACAUDAL; PERINEURAL PRN
Status: DISCONTINUED | OUTPATIENT
Start: 2023-09-18 | End: 2023-09-18 | Stop reason: SDUPTHER

## 2023-09-18 RX ORDER — PROCHLORPERAZINE EDISYLATE 5 MG/ML
5 INJECTION INTRAMUSCULAR; INTRAVENOUS
Status: CANCELLED | OUTPATIENT
Start: 2023-09-18 | End: 2023-09-19

## 2023-09-18 RX ORDER — HYDROMORPHONE HYDROCHLORIDE 1 MG/ML
0.5 INJECTION, SOLUTION INTRAMUSCULAR; INTRAVENOUS; SUBCUTANEOUS EVERY 5 MIN PRN
Status: CANCELLED | OUTPATIENT
Start: 2023-09-18

## 2023-09-18 RX ORDER — HYDROCODONE BITARTRATE AND ACETAMINOPHEN 5; 325 MG/1; MG/1
1 TABLET ORAL EVERY 4 HOURS PRN
Status: CANCELLED | OUTPATIENT
Start: 2023-09-18

## 2023-09-18 RX ORDER — LIDOCAINE HYDROCHLORIDE 10 MG/ML
1 INJECTION, SOLUTION INFILTRATION; PERINEURAL
Status: CANCELLED | OUTPATIENT
Start: 2023-09-18 | End: 2023-09-19

## 2023-09-18 RX ORDER — EPHEDRINE SULFATE 50 MG/ML
INJECTION INTRAVENOUS PRN
Status: DISCONTINUED | OUTPATIENT
Start: 2023-09-18 | End: 2023-09-18 | Stop reason: SDUPTHER

## 2023-09-18 RX ORDER — MIDAZOLAM HYDROCHLORIDE 1 MG/ML
INJECTION INTRAMUSCULAR; INTRAVENOUS PRN
Status: DISCONTINUED | OUTPATIENT
Start: 2023-09-18 | End: 2023-09-18 | Stop reason: SDUPTHER

## 2023-09-18 RX ORDER — SODIUM CHLORIDE 9 MG/ML
INJECTION, SOLUTION INTRAVENOUS PRN
Status: CANCELLED | OUTPATIENT
Start: 2023-09-18

## 2023-09-18 RX ORDER — SODIUM CHLORIDE, SODIUM LACTATE, POTASSIUM CHLORIDE, CALCIUM CHLORIDE 600; 310; 30; 20 MG/100ML; MG/100ML; MG/100ML; MG/100ML
INJECTION, SOLUTION INTRAVENOUS CONTINUOUS
Status: CANCELLED | OUTPATIENT
Start: 2023-09-18

## 2023-09-18 RX ORDER — OXYMETAZOLINE HYDROCHLORIDE 0.05 G/100ML
2 SPRAY NASAL
Status: COMPLETED | OUTPATIENT
Start: 2023-09-18 | End: 2023-09-18

## 2023-09-18 RX ORDER — DEXMEDETOMIDINE HYDROCHLORIDE 100 UG/ML
INJECTION, SOLUTION INTRAVENOUS PRN
Status: DISCONTINUED | OUTPATIENT
Start: 2023-09-18 | End: 2023-09-18 | Stop reason: SDUPTHER

## 2023-09-18 RX ORDER — FENTANYL CITRATE 50 UG/ML
INJECTION, SOLUTION INTRAMUSCULAR; INTRAVENOUS PRN
Status: DISCONTINUED | OUTPATIENT
Start: 2023-09-18 | End: 2023-09-18 | Stop reason: SDUPTHER

## 2023-09-18 RX ORDER — PROPOFOL 10 MG/ML
INJECTION, EMULSION INTRAVENOUS PRN
Status: DISCONTINUED | OUTPATIENT
Start: 2023-09-18 | End: 2023-09-18 | Stop reason: SDUPTHER

## 2023-09-18 RX ORDER — OXYMETAZOLINE HYDROCHLORIDE 0.05 G/100ML
SPRAY NASAL PRN
Status: DISCONTINUED | OUTPATIENT
Start: 2023-09-18 | End: 2023-09-18 | Stop reason: HOSPADM

## 2023-09-18 RX ORDER — ROCURONIUM BROMIDE 10 MG/ML
INJECTION, SOLUTION INTRAVENOUS PRN
Status: DISCONTINUED | OUTPATIENT
Start: 2023-09-18 | End: 2023-09-18 | Stop reason: SDUPTHER

## 2023-09-18 RX ORDER — SODIUM CHLORIDE 0.9 % (FLUSH) 0.9 %
5-40 SYRINGE (ML) INJECTION PRN
Status: CANCELLED | OUTPATIENT
Start: 2023-09-18

## 2023-09-18 RX ORDER — CEFAZOLIN SODIUM 1 G/3ML
INJECTION, POWDER, FOR SOLUTION INTRAMUSCULAR; INTRAVENOUS PRN
Status: DISCONTINUED | OUTPATIENT
Start: 2023-09-18 | End: 2023-09-18 | Stop reason: SDUPTHER

## 2023-09-18 RX ORDER — FENTANYL CITRATE 50 UG/ML
100 INJECTION, SOLUTION INTRAMUSCULAR; INTRAVENOUS
Status: CANCELLED | OUTPATIENT
Start: 2023-09-18 | End: 2023-09-19

## 2023-09-18 RX ORDER — ACETAMINOPHEN 500 MG
1000 TABLET ORAL ONCE
Status: CANCELLED | OUTPATIENT
Start: 2023-09-18 | End: 2023-09-18

## 2023-09-18 RX ORDER — MIDAZOLAM HYDROCHLORIDE 2 MG/2ML
2 INJECTION, SOLUTION INTRAMUSCULAR; INTRAVENOUS
Status: CANCELLED | OUTPATIENT
Start: 2023-09-18 | End: 2023-09-19

## 2023-09-18 RX ORDER — OXYMETAZOLINE HYDROCHLORIDE 0.05 G/100ML
2 SPRAY NASAL EVERY 30 MIN PRN
Status: DISCONTINUED | OUTPATIENT
Start: 2023-09-18 | End: 2023-09-18 | Stop reason: HOSPADM

## 2023-09-18 RX ORDER — HYDRALAZINE HYDROCHLORIDE 20 MG/ML
10 INJECTION INTRAMUSCULAR; INTRAVENOUS
Status: CANCELLED | OUTPATIENT
Start: 2023-09-18

## 2023-09-18 RX ORDER — GINSENG 100 MG
CAPSULE ORAL PRN
Status: DISCONTINUED | OUTPATIENT
Start: 2023-09-18 | End: 2023-09-18 | Stop reason: HOSPADM

## 2023-09-18 RX ORDER — FENTANYL CITRATE 50 UG/ML
25 INJECTION, SOLUTION INTRAMUSCULAR; INTRAVENOUS EVERY 5 MIN PRN
Status: CANCELLED | OUTPATIENT
Start: 2023-09-18

## 2023-09-18 RX ADMIN — Medication 120 MCG: at 09:02

## 2023-09-18 RX ADMIN — OXYMETAZOLINE HCL 2 SPRAY: 0.05 SPRAY NASAL at 08:08

## 2023-09-18 RX ADMIN — DEXAMETHASONE SODIUM PHOSPHATE 8 MG: 4 INJECTION, SOLUTION INTRAMUSCULAR; INTRAVENOUS at 09:00

## 2023-09-18 RX ADMIN — DEXMEDETOMIDINE HYDROCHLORIDE 8 MCG: 100 INJECTION, SOLUTION, CONCENTRATE INTRAVENOUS at 09:45

## 2023-09-18 RX ADMIN — Medication 80 MCG: at 09:07

## 2023-09-18 RX ADMIN — SODIUM CHLORIDE, POTASSIUM CHLORIDE, SODIUM LACTATE AND CALCIUM CHLORIDE: 600; 310; 30; 20 INJECTION, SOLUTION INTRAVENOUS at 08:44

## 2023-09-18 RX ADMIN — CEFAZOLIN 2 G: 330 INJECTION, POWDER, FOR SOLUTION INTRAMUSCULAR; INTRAVENOUS at 08:59

## 2023-09-18 RX ADMIN — EPHEDRINE SULFATE 10 MG: 50 INJECTION INTRAVENOUS at 09:07

## 2023-09-18 RX ADMIN — ROCURONIUM BROMIDE 5 MG: 10 SOLUTION INTRAVENOUS at 08:50

## 2023-09-18 RX ADMIN — FENTANYL CITRATE 50 MCG: 50 INJECTION, SOLUTION INTRAMUSCULAR; INTRAVENOUS at 08:50

## 2023-09-18 RX ADMIN — LIDOCAINE HYDROCHLORIDE 100 MG: 20 INJECTION, SOLUTION EPIDURAL; INFILTRATION; INTRACAUDAL; PERINEURAL at 08:50

## 2023-09-18 RX ADMIN — DEXMEDETOMIDINE HYDROCHLORIDE 8 MCG: 100 INJECTION, SOLUTION, CONCENTRATE INTRAVENOUS at 09:02

## 2023-09-18 RX ADMIN — Medication 160 MG: at 08:50

## 2023-09-18 RX ADMIN — PROPOFOL 180 MG: 10 INJECTION, EMULSION INTRAVENOUS at 08:50

## 2023-09-18 RX ADMIN — PHENYLEPHRINE HYDROCHLORIDE 80 MCG/MIN: 10 INJECTION INTRAVENOUS at 09:37

## 2023-09-18 RX ADMIN — EPHEDRINE SULFATE 5 MG: 50 INJECTION INTRAVENOUS at 09:36

## 2023-09-18 RX ADMIN — FENTANYL CITRATE 50 MCG: 50 INJECTION, SOLUTION INTRAMUSCULAR; INTRAVENOUS at 08:55

## 2023-09-18 RX ADMIN — MIDAZOLAM 2 MG: 1 INJECTION INTRAMUSCULAR; INTRAVENOUS at 08:44

## 2023-09-18 RX ADMIN — EPHEDRINE SULFATE 10 MG: 50 INJECTION INTRAVENOUS at 09:21

## 2023-09-18 RX ADMIN — ONDANSETRON HYDROCHLORIDE 4 MG: 2 INJECTION, SOLUTION INTRAMUSCULAR; INTRAVENOUS at 09:00

## 2023-09-18 RX ADMIN — Medication 80 MCG: at 09:15

## 2023-09-18 ASSESSMENT — PAIN - FUNCTIONAL ASSESSMENT: PAIN_FUNCTIONAL_ASSESSMENT: 0-10

## 2023-09-18 ASSESSMENT — PAIN SCALES - GENERAL
PAINLEVEL_OUTOF10: 0
PAINLEVEL_OUTOF10: 0

## 2023-09-18 NOTE — DISCHARGE INSTRUCTIONS
hit by small children or a restless bedmate. Do not put anything into your nose. This includes your fingers, cotton-tipped applicators, tissues or handkerchiefs. Any of these items might accidentally injure your nose. You may find that a cool mist room humidifier is helpful in decreasing the amount of dryness in your nose and mouth. After your surgery you should use a nasal spray such as [place] or [employer]. Use 4 sprays in each nostril every two hours while awake to help prevent crusts from forming in your nose. MEDICINES TO AVOID:     DO NOT TAKE ANY ASPIRIN-CONTAINING MEDICATIONS OR IBUPROFEN MEDICINES (SUCH AS ADVIL OR NUPRIN). These medications can cause an increase in bleeding. If you think you may be taking a medicine that contains aspirin, ask your pharmacist.    RETURN APPOINTMENT:    You will have a follow-up appointment with your doctor. At this time your nose will be gently and carefully examined and any crusts that have formed will be removed. The removal of crusts may be somewhat uncomfortable for you since your nose is likely to still be tender from the surgery. Because of this, the doctor will spray your nose with special numbing medicine before removing the crusts. NOTIFY YOUR DOCTOR IF YOU HAVE:    A sudden increase in the amount of bleeding from the nose. A fever above 101 degrees F, which persists despite increasing the amount of fluids you take. A person with fever should try to drink approximately one cup of fluid each waking hour. Persistent sharp pain that is not relieved by the pain medication you receive. Increased swelling or redness of your nose. If you have any questions or concerns following your surgery do not hesitate to contact our office at     [address] [place] [address] office hours are 8:00 a.m. to 4:30 p.m. You should be able to reach us after hours by calling the regular office number.   If for some reason you are

## 2023-09-18 NOTE — OP NOTE
The Reid-anna forceps were used to resect the deviated portion of the bony septum. The mucosal flaps were replaced, and the deviation had been corrected. The wound was irrigated out with saline and the harvested cartilage was morselized and replaced in the nasal cavity between the mucosal flaps. The incision was closed with 4.0 chromic and a 4.0 plain gut was used in a mattress fashion to reapproximate the mucosal flaps. Again, my attention was turned back to the right side. The patient had a  very large inferior turbinate, and so the Coblation device wand was used to  make a very small, 2-mm incision in the anterior middle turbinate, and then  3 passes were made bluntly along the inferior turbinate submucosally. The  bone and soft tissue were ablated using the Coblation device along these 3  tracts. Special attention was then made at ablating the very anterior head  of the inferior turbinate. Next, the inferior turbinate was outfractured  using a Matias elevator. This reduced the volume of the inferior  turbinate by approximately 50%. Next, the procedure was done on the left side, where the inferior turbinate  had a small incision made with the Coblation device. Three passes were  made submucosally along the bone. The bone and submucosal area were  ablated using the Coblation device on the setting of 6 in these 3 passes,  and then additional Coblation was done on the head of the inferior  turbinate, and then the turbinate was outfractured using a Matias  elevator. This reduced the volume of the turbinate by 50%. Next, the nasal cavities were irrigated with copious amounts of normal   saline bilaterally. Bacitracin coated sumner splints were placed in the left and right nasal cavity and secured using a 3.0 nylon stitch.  The patient was turned back over to   the Anesthesia staff, awakened in the operating room and transferred to the   recovery room, awake and alert, and in very

## 2023-09-18 NOTE — ANESTHESIA POSTPROCEDURE EVALUATION
Department of Anesthesiology  Postprocedure Note    Patient: Melissa Brown  MRN: [id number]  YOB: 1950  Date of evaluation: 9/18/2023      Procedure Summary     Date: 09/18/23 Room / Location: [facility] ASU A1 / [facility] AMBULATORY OR    Anesthesia Start: 9045 Anesthesia Stop: 1005    Procedure: REPAIR OF NASAL SEPTUM/RESECTION OF TURBINATE BONES (Bilateral: Nose) Diagnosis:       Deviated nasal septum      Hypertrophy of nasal turbinates      (Deviated nasal septum [J34.2])      (Hypertrophy of nasal turbinates [J34.3])    Surgeons: Kailey Soto MD Responsible Provider: Mike Castillo DO    Anesthesia Type: General ASA Status: 3          Anesthesia Type: General    Donte Phase I: Donte Score: 10    Donte Phase II:        Anesthesia Post Evaluation    Patient location during evaluation: PACU  Level of consciousness: awake  Airway patency: patent  Nausea & Vomiting: no nausea  Complications: no  Cardiovascular status: hemodynamically stable  Respiratory status: acceptable  Hydration status: stable  Multimodal analgesia pain management approach  Pain management: adequate

## 2023-09-18 NOTE — ANESTHESIA PRE PROCEDURE
Department of Anesthesiology  Preprocedure Note       Name:  Carlos Oliveira   Age:  81 y.o.  :  1950                                          MRN:  [id number]         Date:  2023      Surgeon: Amelie Jaime):  Tasha Calvo MD    Procedure: Procedure(s):  REPAIR OF NASAL SEPTUM/RESECTION OF TURBINATE BONES    Medications prior to admission:   Prior to Admission medications    Medication Sig Start Date End Date Taking?  Authorizing Provider   Krill Oil 500 MG CAPS Take 500 mg by mouth daily    Historical Provider, MD   POTASSIUM PO Take 99 mg by mouth nightly    Historical Provider, MD   Semaglutide (OZEMPIC, 0.25 OR 0.5 MG/DOSE, SC) Inject 0.5 mg into the skin every 7 days ON SATURDAY    Historical Provider, MD   albuterol sulfate HFA (VENTOLIN HFA) 108 (90 Base) MCG/ACT inhaler Inhale 2 puffs into the lungs every 4 hours as needed  Patient not taking: Reported on 2023 12/16/15   Ar Automatic Reconciliation   amLODIPine (NORVASC) 10 MG tablet Take 1 tablet by mouth nightly 12/30/15   Ar Automatic Reconciliation   aspirin 325 MG tablet Take 1 tablet by mouth daily    Ar Automatic Reconciliation   empagliflozin (JARDIANCE) 10 MG tablet 2.5 tablets daily 19   Ar Automatic Reconciliation   fenofibrate (LIPOFEN) 150 MG CAPS capsule Take 145 mg by mouth nightly    Ar Automatic Reconciliation   fluticasone-salmeterol (ADVAIR DISKUS) 250-50 MCG/ACT AEPB diskus inhaler Inhale 250 puffs into the lungs 2 times daily 10/20/15   Ar Automatic Reconciliation   glipiZIDE (GLUCOTROL XL) 10 MG extended release tablet Take 1 tablet by mouth in the morning and at bedtime 16   Ar Automatic Reconciliation   irbesartan (AVAPRO) 300 MG tablet Take 1 tablet by mouth daily 12/12/15   Ar Automatic Reconciliation   metFORMIN (GLUCOPHAGE) 1000 MG tablet Take 1 tablet by mouth 2 times daily 16   Ar Automatic Reconciliation   rosuvastatin (CRESTOR) 10 MG tablet Take 1 tablet by mouth nightly 12/30/15   Ar

## 2023-09-18 NOTE — PERIOP NOTE
I have reviewed discharge instructions with the  spouse-Ewa. The  spouse-Ewa verbalized understanding. All questions addressed at this time. A paper copy of these instructions have been given to the patient to take home.

## 2024-01-10 ENCOUNTER — TELEMEDICINE (OUTPATIENT)
Age: 74
End: 2024-01-10
Payer: COMMERCIAL

## 2024-01-10 ENCOUNTER — CLINICAL DOCUMENTATION (OUTPATIENT)
Age: 74
End: 2024-01-10

## 2024-01-10 ENCOUNTER — TELEPHONE (OUTPATIENT)
Age: 74
End: 2024-01-10

## 2024-01-10 DIAGNOSIS — G47.33 OBSTRUCTIVE SLEEP APNEA (ADULT) (PEDIATRIC): Primary | ICD-10-CM

## 2024-01-10 PROCEDURE — 1123F ACP DISCUSS/DSCN MKR DOCD: CPT | Performed by: NURSE PRACTITIONER

## 2024-01-10 PROCEDURE — 99213 OFFICE O/P EST LOW 20 MIN: CPT | Performed by: NURSE PRACTITIONER

## 2024-01-10 RX ORDER — FENOFIBRATE 145 MG/1
145 TABLET, COATED ORAL DAILY
COMMUNITY
Start: 2023-10-20

## 2024-01-10 RX ORDER — CLOBETASOL PROPIONATE 0.5 MG/G
OINTMENT TOPICAL
COMMUNITY
Start: 2023-11-22

## 2024-01-10 RX ORDER — EMPAGLIFLOZIN 25 MG/1
25 TABLET, FILM COATED ORAL DAILY
COMMUNITY
Start: 2023-11-04

## 2024-01-10 RX ORDER — KETOCONAZOLE 20 MG/ML
SHAMPOO TOPICAL
COMMUNITY
Start: 2024-01-07

## 2024-01-10 RX ORDER — SEMAGLUTIDE 1.34 MG/ML
INJECTION, SOLUTION SUBCUTANEOUS
COMMUNITY
Start: 2023-11-20

## 2024-01-10 RX ORDER — ASPIRIN 325 MG
325 TABLET ORAL DAILY
COMMUNITY

## 2024-01-10 ASSESSMENT — SLEEP AND FATIGUE QUESTIONNAIRES
HOW LIKELY ARE YOU TO NOD OFF OR FALL ASLEEP WHILE SITTING INACTIVE IN A PUBLIC PLACE: 1
HOW LIKELY ARE YOU TO NOD OFF OR FALL ASLEEP IN A CAR, WHILE STOPPED FOR A FEW MINUTES IN TRAFFIC: 1
HOW LIKELY ARE YOU TO NOD OFF OR FALL ASLEEP WHILE SITTING AND TALKING TO SOMEONE: 1
HOW LIKELY ARE YOU TO NOD OFF OR FALL ASLEEP WHILE WATCHING TV: 2
HOW LIKELY ARE YOU TO NOD OFF OR FALL ASLEEP WHILE SITTING AND READING: 1
HOW LIKELY ARE YOU TO NOD OFF OR FALL ASLEEP WHILE LYING DOWN TO REST IN THE AFTERNOON WHEN CIRCUMSTANCES PERMIT: 1
HOW LIKELY ARE YOU TO NOD OFF OR FALL ASLEEP WHEN YOU ARE A PASSENGER IN A CAR FOR AN HOUR WITHOUT A BREAK: 0
HOW LIKELY ARE YOU TO NOD OFF OR FALL ASLEEP WHILE SITTING QUIETLY AFTER LUNCH WITHOUT ALCOHOL: 1
ESS TOTAL SCORE: 8

## 2024-01-10 NOTE — PATIENT INSTRUCTIONS
[address]  [place] [address]  Tel.  [phone]  Fax. [phone] [address]  [place] [address]  Tel.  [phone]  Fax. [phone] [address] [place] [address]  Tel.  [phone]  Fax. [phone]     Learning About CPAP for Sleep Apnea  What is CPAP?              CPAP is a small machine that you use at home every night while you sleep. It increases air pressure in your throat to keep your airway open. When you have sleep apnea, this can help you sleep better so you feel much better. CPAP stands for \"continuous positive airway pressure.\"  The CPAP machine will have one of the following:  A mask that covers your nose and mouth  Prongs that fit into your nose  A mask that covers your nose only, the most common type. This type is called NCPAP. The N stands for \"nasal.\"  Why is it done?  CPAP is usually the best treatment for obstructive sleep apnea. It is the first treatment choice and the most widely used. Your doctor may suggest CPAP if you have:  Moderate to severe sleep apnea.  Sleep apnea and coronary artery disease (CAD) or heart failure.  How does it help?  CPAP can help you have more normal sleep, so you feel less sleepy and more alert during the daytime.  CPAP may help keep heart failure or other heart problems from getting worse.  NCPAP may help lower your blood pressure.  If you use CPAP, your bed partner may also sleep better because you are not snoring or restless.  What are the side effects?  Some people who use CPAP have:  A dry or stuffy nose and a sore throat.  Irritated skin on the face.  Sore eyes.  Bloating.  If you have any of these problems, work with your doctor to fix them. Here are some things you can try:  Be sure the mask or nasal prongs fit well.  See if your doctor can adjust the pressure of your CPAP.  If your nose is dry, try a humidifier.  If your nose is runny or stuffy, try decongestant medicine or a steroid

## 2024-01-10 NOTE — PROGRESS NOTES
sleep quality over therapy time.    Sleep Review of Systems: notable for Negative difficulty falling asleep; Positive awakenings at night; Negative early morning headaches; Negative memory problems; Negative concentration issues; Negative chest pain; Negative shortness of breath; Negative significant joint pain at night; Negative rashes or itching; Negative heartburn; Negative significant mood issues; 0 afternoon naps per week    Vitals reported by patient       1/10/2024     3:00 PM   Patient-Reported Vitals   Patient-Reported Weight 242 lbs         Calculated BMI 33    Physical Exam completed by visual and auditory observation of patient with verbal input from patient.    General:   Alert, oriented, not in acute distress   Eyes:  Anicteric Sclerae; no obvious strabismus   Nose:  No obvious nasal septum deviation    Neck:   Midline trachea, no visible mass   Chest/Lungs:  Respiratory effort normal, no visualized signs of difficulty breathing or respiratory distress   CVS:  No JVD   Extremities:  No obvious rashes noted on face, neck, or hands   Neuro:  No facial asymmetry, no focal deficits; no obvious tremor    Psych:  Normal affect,  normal countenance     Marv De Oliveira, was evaluated through a synchronous (real-time) audio-video encounter. The patient (or guardian if applicable) is aware that this is a billable service, which includes applicable co-pays. This Virtual Visit was conducted with patient's (and/or legal guardian's) consent. Patient identification was verified, and a caregiver was present when appropriate.   The patient was located at Home: [address] [place] [address]  Provider was located at Facility (Appt Dept): [address] [place] [address] [address]  [place] [address]    On this date 1/10/2024 I have spent 20 minutes reviewing previous notes, test results and face to face with the patient discussing the diagnosis and importance of compliance with the treatment plan as well as documenting on

## 2024-01-10 NOTE — TELEPHONE ENCOUNTER
Called patient left a voice message to pre-register for Telemed visit, complete mychart questionnaires and verify virtual link.

## 2024-01-11 ENCOUNTER — TELEPHONE (OUTPATIENT)
Age: 74
End: 2024-01-11

## 2024-01-11 NOTE — TELEPHONE ENCOUNTER
Attempted to contact patient to schedule yearly follow-up. LVM requesting that patient return call to schedule yearly follow-up for around 1/10/2025. Also informed via voicemail that the PAP supply order has been faxed to [facility].

## 2024-10-14 ENCOUNTER — TELEPHONE (OUTPATIENT)
Age: 74
End: 2024-10-14

## 2024-10-14 NOTE — TELEPHONE ENCOUNTER
Called patient and LVM we need to cancel appointment scheduled with NPJ on 1/15/2025 due to NPJ no longer with [facility]. Please call the office back to reschedule this appointment. Also, sent [employer] message and mailed letter.

## 2025-03-07 ENCOUNTER — TELEPHONE (OUTPATIENT)
Age: 75
End: 2025-03-07

## 2025-03-07 NOTE — TELEPHONE ENCOUNTER
Patient's wife Nery called office to confirm the date of patient's appointment. Patient's wife not on PHI and she requested our office call the patient to confirm his appointment. Called patient and left voicemail with date and time of his appointment on 03/11/2025.

## 2025-03-11 ENCOUNTER — OFFICE VISIT (OUTPATIENT)
Age: 75
End: 2025-03-11
Payer: COMMERCIAL

## 2025-03-11 ENCOUNTER — TELEPHONE (OUTPATIENT)
Age: 75
End: 2025-03-11

## 2025-03-11 VITALS
DIASTOLIC BLOOD PRESSURE: 69 MMHG | HEART RATE: 87 BPM | TEMPERATURE: 98 F | SYSTOLIC BLOOD PRESSURE: 136 MMHG | HEIGHT: 72 IN | OXYGEN SATURATION: 88 % | WEIGHT: 248 LBS | BODY MASS INDEX: 33.59 KG/M2

## 2025-03-11 DIAGNOSIS — G47.33 OBSTRUCTIVE SLEEP APNEA (ADULT) (PEDIATRIC): Primary | ICD-10-CM

## 2025-03-11 DIAGNOSIS — I10 PRIMARY HYPERTENSION: ICD-10-CM

## 2025-03-11 PROCEDURE — 3075F SYST BP GE 130 - 139MM HG: CPT | Performed by: INTERNAL MEDICINE

## 2025-03-11 PROCEDURE — 3078F DIAST BP <80 MM HG: CPT | Performed by: INTERNAL MEDICINE

## 2025-03-11 PROCEDURE — 99214 OFFICE O/P EST MOD 30 MIN: CPT | Performed by: INTERNAL MEDICINE

## 2025-03-11 PROCEDURE — 1123F ACP DISCUSS/DSCN MKR DOCD: CPT | Performed by: INTERNAL MEDICINE

## 2025-03-11 ASSESSMENT — SLEEP AND FATIGUE QUESTIONNAIRES
HOW LIKELY ARE YOU TO NOD OFF OR FALL ASLEEP WHILE LYING DOWN TO REST IN THE AFTERNOON WHEN CIRCUMSTANCES PERMIT: MODERATE CHANCE OF DOZING
HOW LIKELY ARE YOU TO NOD OFF OR FALL ASLEEP WHEN YOU ARE A PASSENGER IN A CAR FOR AN HOUR WITHOUT A BREAK: SLIGHT CHANCE OF DOZING
HOW LIKELY ARE YOU TO NOD OFF OR FALL ASLEEP WHILE SITTING QUIETLY AFTER LUNCH WITHOUT ALCOHOL: WOULD NEVER DOZE
HOW LIKELY ARE YOU TO NOD OFF OR FALL ASLEEP WHILE WATCHING TV: MODERATE CHANCE OF DOZING
HOW LIKELY ARE YOU TO NOD OFF OR FALL ASLEEP IN A CAR, WHILE STOPPED FOR A FEW MINUTES IN TRAFFIC: WOULD NEVER DOZE
HOW LIKELY ARE YOU TO NOD OFF OR FALL ASLEEP WHILE SITTING AND TALKING TO SOMEONE: WOULD NEVER DOZE
ESS TOTAL SCORE: 6
HOW LIKELY ARE YOU TO NOD OFF OR FALL ASLEEP WHILE SITTING AND READING: SLIGHT CHANCE OF DOZING
HOW LIKELY ARE YOU TO NOD OFF OR FALL ASLEEP WHILE SITTING INACTIVE IN A PUBLIC PLACE: WOULD NEVER DOZE

## 2025-03-11 NOTE — PROGRESS NOTES
[address]  [place] [address]  Tel.  [phone]  Fax. [phone] [address]  [place] [address]  Tel.  [phone]  Fax. [phone] [address] [place] [address]  Tel.  [phone]  Fax. [phone]     S>Marv De Oliveira is a 74 y.o. male seen for a positive airway pressure follow-up.  He reports no problems using the device.  The following problems are identified:    Drowsiness no Problems exhaling no   Snoring no Forget to put on no   Mask Comfortable yes Can't fall asleep no   Dry Mouth no Mask falls off no   Air Leaking no Frequent awakenings no     Estimated AHI flow from download shows 6.5/hour.   no significant leak  Averaging almost 5/hours use on nights used  Days with usage 97%  Adherence 80%  Weight from last visit 242 lb      He admits that his sleep has improved.      Allergies   Allergen Reactions    Molds & Smuts Shortness Of Breath    Shellfish Allergy Shortness Of Breath     JUST OYSTERS        He has a current medication list which includes the following prescription(s): jardiance, ozempic (1 mg/dose), aspirin, ketoconazole, fenofibrate, clobetasol, krill oil, potassium, semaglutide, albuterol sulfate hfa, amlodipine, empagliflozin, fenofibrate, fluticasone-salmeterol, glipizide, irbesartan, metformin, rosuvastatin, and torsemide..      He  has a past medical history of Arthritis, CAD (coronary artery disease), COPD (chronic obstructive pulmonary disease) (HCC), Diabetes mellitus (HCC), Hyperlipidemia, Hypertension, and Sleep apnea.        3/11/2025     2:48 PM 1/10/2024     1:38 PM 12/28/2022    10:00 AM 12/29/2021     9:00 AM 9/9/2021    10:21 AM   Sleep Medicine   Sitting and reading 1 1 1 1 1   Watching TV 2 2 1 2 1   Sitting, inactive in a public place (e.g. a theatre or a meeting) 0 1 0 1 1   As a passenger in a car for an hour without a break 1 0 0 1 0   Lying down to rest in the afternoon when circumstances permit

## 2025-03-11 NOTE — PATIENT INSTRUCTIONS
[address]  [place] [address]  Tel.  [phone]  Fax. [phone] [address]  [place] [address]  Tel.  [phone]  Fax. [phone] [address] [place] [address]  Tel.  [phone]  Fax. [phone]     PROPER SLEEP HYGIENE    What to avoid  Do not have drinks with caffeine, such as coffee or black tea, for 8 hours before bed.  Do not smoke or use other types of tobacco near bedtime. Nicotine is a stimulant and can keep you awake.  Avoid drinking alcohol late in the evening, because it can cause you to wake in the middle of the night.  Do not eat a big meal close to bedtime. If you are hungry, eat a light snack.  Do not drink a lot of water close to bedtime, because the need to urinate may wake you up during the night.  Do not read or watch TV in bed. Use the bed only for sleeping and sexual activity.  What to try  Go to bed at the same time every night, and wake up at the same time every morning. Do not take naps during the day.  Keep your bedroom quiet, dark, and cool.  Get regular exercise, but not within 3 to 4 hours of your bedtime..  Sleep on a comfortable pillow and mattress.  If watching the clock makes you anxious, turn it facing away from you so you cannot see the time.  If you worry when you lie down, start a worry book. Well before bedtime, write down your worries, and then set the book and your concerns aside.  Try meditation or other relaxation techniques before you go to bed.  If you cannot fall asleep, get up and go to another room until you feel sleepy. Do something relaxing. Repeat your bedtime routine before you go to bed again.  Make your house quiet and calm about an hour before bedtime. Turn down the lights, turn off the TV, log off the computer, and turn down the volume on music. This can help you relax after a busy day.    Drowsy Driving  The [place] National Highway Traffic Safety Administration cites drowsiness as a

## 2025-03-11 NOTE — TELEPHONE ENCOUNTER
Patient called office to confirm location of patient's appointment schedule for today 03/11/2025. Confirmed address to [facility] location. Patient would like any follow-up visits to be schedule at [facility] location.

## 2025-03-12 ENCOUNTER — CLINICAL DOCUMENTATION (OUTPATIENT)
Age: 75
End: 2025-03-12

## 2025-04-01 ENCOUNTER — TELEPHONE (OUTPATIENT)
Age: 75
End: 2025-04-01

## 2025-04-01 NOTE — TELEPHONE ENCOUNTER
Patient's wife Nery called office to discuss having patient schedule with a sleep provider. Requested patient call office to discuss any health information and to discuss any follow-up appointments due to not having signed PHI on file with patient's wife listed. Patient's wife stated she will have patient contact office to discuss.

## (undated) DEVICE — Device

## (undated) DEVICE — SUTURE CHROMIC GUT SZ 4-0 L18IN ABSRB BRN L19MM PS-2 3/8 1637G

## (undated) DEVICE — CODMAN® SURGICAL PATTIES 1" X 3" (2.54CM X 7.62CM): Brand: CODMAN®

## (undated) DEVICE — ENT-SMH: Brand: MEDLINE INDUSTRIES, INC.

## (undated) DEVICE — PAD,EYE,1-5/8X2 5/8,STERILE,LF,1/PK: Brand: MEDLINE

## (undated) DEVICE — SOLUTION IRRIG 1000ML 0.9% SOD CHL USP POUR PLAS BTL

## (undated) DEVICE — SYRINGE MED 10ML LUERLOCK TIP W/O SFTY DISP

## (undated) DEVICE — YANKAUER,BULB TIP,W/O VENT,RIGID,STERILE: Brand: MEDLINE

## (undated) DEVICE — FIRM 4CM: Brand: NASOPORE

## (undated) DEVICE — BASIN ST MAJOR-NO CAUTERY: Brand: MEDLINE INDUSTRIES, INC.

## (undated) DEVICE — SUTURE ABSORBABLE MONOFILAMENT 4-0 SC-1 18 IN PLN GUT 1824H

## (undated) DEVICE — TUBING, SUCTION, 1/4" X 10', STRAIGHT: Brand: MEDLINE

## (undated) DEVICE — BLADE 1884004HR TRICUT 5PK M4 4MM ROTATE: Brand: TRICUT

## (undated) DEVICE — REFLEX ULTRA 45 WITH INTEGRATED CABLE: Brand: COBLATION

## (undated) DEVICE — SUTURE ETHLN SZ 3-0 L18IN NONABSORBABLE BLK PS-2 L19MM 3/8 1669H

## (undated) DEVICE — INTENT OT USE PROVIDES A STERILE INTERFACE BETWEEN THE OPERATING ROOM SURGICAL LAMPS (NON-STERILE) AND THE SURGEON OR STAFF WORKING IN THE STERILE FIELD.: Brand: ASPEN® ALC PLUS LIGHT HANDLE COVER

## (undated) DEVICE — SYRINGE 20ML LL S/C 50

## (undated) DEVICE — GARMENT,MEDLINE,DVT,INT,CALF,MED, GEN2: Brand: MEDLINE